# Patient Record
Sex: MALE | Race: WHITE | NOT HISPANIC OR LATINO
[De-identification: names, ages, dates, MRNs, and addresses within clinical notes are randomized per-mention and may not be internally consistent; named-entity substitution may affect disease eponyms.]

---

## 2017-01-04 ENCOUNTER — APPOINTMENT (OUTPATIENT)
Dept: CARDIOTHORACIC SURGERY | Facility: CLINIC | Age: 47
End: 2017-01-04

## 2017-01-04 VITALS
RESPIRATION RATE: 15 BRPM | DIASTOLIC BLOOD PRESSURE: 82 MMHG | HEART RATE: 77 BPM | HEIGHT: 67 IN | OXYGEN SATURATION: 100 % | TEMPERATURE: 97.3 F | SYSTOLIC BLOOD PRESSURE: 138 MMHG

## 2017-01-04 DIAGNOSIS — I71.2 THORACIC AORTIC ANEURYSM, W/OUT RUPTURE: ICD-10-CM

## 2017-01-04 DIAGNOSIS — R06.02 SHORTNESS OF BREATH: ICD-10-CM

## 2017-01-10 ENCOUNTER — APPOINTMENT (OUTPATIENT)
Dept: CARDIOLOGY | Facility: CLINIC | Age: 47
End: 2017-01-10

## 2017-01-10 ENCOUNTER — NON-APPOINTMENT (OUTPATIENT)
Age: 47
End: 2017-01-10

## 2017-01-10 VITALS
OXYGEN SATURATION: 97 % | RESPIRATION RATE: 12 BRPM | HEIGHT: 67 IN | HEART RATE: 69 BPM | BODY MASS INDEX: 25.74 KG/M2 | SYSTOLIC BLOOD PRESSURE: 117 MMHG | WEIGHT: 164 LBS | DIASTOLIC BLOOD PRESSURE: 78 MMHG

## 2017-01-12 ENCOUNTER — APPOINTMENT (OUTPATIENT)
Dept: CARDIOLOGY | Facility: CLINIC | Age: 47
End: 2017-01-12

## 2017-01-16 LAB
25(OH)D3 SERPL-MCNC: 22.9 NG/ML
ALBUMIN SERPL ELPH-MCNC: 4.8 G/DL
ALP BLD-CCNC: 76 U/L
ALT SERPL-CCNC: 27 U/L
ANION GAP SERPL CALC-SCNC: 14 MMOL/L
APPEARANCE: CLEAR
AST SERPL-CCNC: 30 U/L
BASOPHILS # BLD AUTO: 0.04 K/UL
BASOPHILS NFR BLD AUTO: 0.6 %
BILIRUB SERPL-MCNC: 1 MG/DL
BILIRUBIN URINE: NEGATIVE
BLOOD URINE: NEGATIVE
BUN SERPL-MCNC: 15 MG/DL
CALCIUM SERPL-MCNC: 10.1 MG/DL
CHLORIDE SERPL-SCNC: 99 MMOL/L
CHOLEST SERPL-MCNC: 125 MG/DL
CHOLEST/HDLC SERPL: 2.3 RATIO
CO2 SERPL-SCNC: 26 MMOL/L
COLOR: YELLOW
CREAT SERPL-MCNC: 0.9 MG/DL
CREAT SPEC-SCNC: 55 MG/DL
EOSINOPHIL # BLD AUTO: 0.11 K/UL
EOSINOPHIL NFR BLD AUTO: 1.7 %
GLUCOSE QUALITATIVE U: NORMAL MG/DL
GLUCOSE SERPL-MCNC: 73 MG/DL
HCT VFR BLD CALC: 45.8 %
HDLC SERPL-MCNC: 55 MG/DL
HGB BLD-MCNC: 15.1 G/DL
IMM GRANULOCYTES NFR BLD AUTO: 0.2 %
KETONES URINE: NEGATIVE
LDLC SERPL CALC-MCNC: 55 MG/DL
LEUKOCYTE ESTERASE URINE: NEGATIVE
LYMPHOCYTES # BLD AUTO: 1.35 K/UL
LYMPHOCYTES NFR BLD AUTO: 21.4 %
MAN DIFF?: NORMAL
MCHC RBC-ENTMCNC: 31.7 PG
MCHC RBC-ENTMCNC: 33 GM/DL
MCV RBC AUTO: 96 FL
MICROALBUMIN 24H UR DL<=1MG/L-MCNC: 0.5 MG/DL
MICROALBUMIN/CREAT 24H UR-RTO: 9 UG/MG
MONOCYTES # BLD AUTO: 0.59 K/UL
MONOCYTES NFR BLD AUTO: 9.3 %
NEUTROPHILS # BLD AUTO: 4.22 K/UL
NEUTROPHILS NFR BLD AUTO: 66.8 %
NITRITE URINE: NEGATIVE
PH URINE: 7.5
PLATELET # BLD AUTO: 254 K/UL
POTASSIUM SERPL-SCNC: 4.5 MMOL/L
PROT SERPL-MCNC: 7.9 G/DL
PROTEIN URINE: NEGATIVE MG/DL
RBC # BLD: 4.77 M/UL
RBC # FLD: 12.9 %
SODIUM SERPL-SCNC: 139 MMOL/L
SPECIFIC GRAVITY URINE: 1.01
TRIGL SERPL-MCNC: 76 MG/DL
TSH SERPL-ACNC: 1.18 UIU/ML
UROBILINOGEN URINE: NORMAL MG/DL
WBC # FLD AUTO: 6.32 K/UL

## 2017-02-07 ENCOUNTER — MESSAGE (OUTPATIENT)
Age: 47
End: 2017-02-07

## 2017-02-17 ENCOUNTER — OUTPATIENT (OUTPATIENT)
Dept: OUTPATIENT SERVICES | Facility: HOSPITAL | Age: 47
LOS: 1 days | End: 2017-02-17
Payer: COMMERCIAL

## 2017-02-17 DIAGNOSIS — N44.00 TORSION OF TESTIS, UNSPECIFIED: Chronic | ICD-10-CM

## 2017-02-17 DIAGNOSIS — Z98.890 OTHER SPECIFIED POSTPROCEDURAL STATES: Chronic | ICD-10-CM

## 2017-02-17 PROCEDURE — 71552 MRI CHEST W/O & W/DYE: CPT | Mod: 26

## 2017-02-27 ENCOUNTER — OUTPATIENT (OUTPATIENT)
Dept: OUTPATIENT SERVICES | Facility: HOSPITAL | Age: 47
LOS: 1 days | End: 2017-02-27
Payer: MEDICAID

## 2017-02-27 ENCOUNTER — APPOINTMENT (OUTPATIENT)
Dept: THORACIC SURGERY | Facility: CLINIC | Age: 47
End: 2017-02-27

## 2017-02-27 VITALS
OXYGEN SATURATION: 97 % | DIASTOLIC BLOOD PRESSURE: 71 MMHG | WEIGHT: 164 LBS | BODY MASS INDEX: 25.69 KG/M2 | TEMPERATURE: 98 F | SYSTOLIC BLOOD PRESSURE: 119 MMHG | RESPIRATION RATE: 18 BRPM | HEART RATE: 66 BPM

## 2017-02-27 DIAGNOSIS — N44.00 TORSION OF TESTIS, UNSPECIFIED: Chronic | ICD-10-CM

## 2017-02-27 DIAGNOSIS — Z98.890 OTHER SPECIFIED POSTPROCEDURAL STATES: Chronic | ICD-10-CM

## 2017-02-27 DIAGNOSIS — D49.89 NEOPLASM OF UNSPECIFIED BEHAVIOR OF OTHER SPECIFIED SITES: ICD-10-CM

## 2017-02-27 LAB — HCG SERPL-ACNC: <1 MIU/ML — SIGNIFICANT CHANGE UP

## 2017-02-27 PROCEDURE — 86041 ACETYLCHOLN RCPTR BNDNG ANTB: CPT

## 2017-02-27 PROCEDURE — 84702 CHORIONIC GONADOTROPIN TEST: CPT

## 2017-02-27 PROCEDURE — 82105 ALPHA-FETOPROTEIN SERUM: CPT

## 2017-02-28 LAB — AFP-TM SERPL-MCNC: 4.8 NG/ML — SIGNIFICANT CHANGE UP

## 2017-03-01 ENCOUNTER — APPOINTMENT (OUTPATIENT)
Dept: CARDIOTHORACIC SURGERY | Facility: CLINIC | Age: 47
End: 2017-03-01

## 2017-03-01 ENCOUNTER — APPOINTMENT (OUTPATIENT)
Dept: THORACIC SURGERY | Facility: CLINIC | Age: 47
End: 2017-03-01

## 2017-03-08 LAB
ACRM MODULATING ANTIBODY: SIGNIFICANT CHANGE UP
ACRM MODULATING ANTIBODY: SIGNIFICANT CHANGE UP
P/Q TYPE ANTIBODY: SIGNIFICANT CHANGE UP
STRIA MUS AB TITR SER: SIGNIFICANT CHANGE UP
VGCC-N BIND AB SER-SCNC: SIGNIFICANT CHANGE UP

## 2017-03-27 ENCOUNTER — APPOINTMENT (OUTPATIENT)
Dept: HEMATOLOGY ONCOLOGY | Facility: CLINIC | Age: 47
End: 2017-03-27

## 2017-04-16 ENCOUNTER — MEDICATION RENEWAL (OUTPATIENT)
Age: 47
End: 2017-04-16

## 2017-05-12 ENCOUNTER — TRANSCRIPTION ENCOUNTER (OUTPATIENT)
Age: 47
End: 2017-05-12

## 2017-06-07 ENCOUNTER — APPOINTMENT (OUTPATIENT)
Dept: UROLOGY | Facility: CLINIC | Age: 47
End: 2017-06-07

## 2017-06-07 PROBLEM — R22.2 CHEST MASS: Status: ACTIVE | Noted: 2017-06-07

## 2017-06-07 LAB
APPEARANCE: CLEAR
BACTERIA: NEGATIVE
BILIRUBIN URINE: NEGATIVE
BLOOD URINE: NEGATIVE
COLOR: YELLOW
GLUCOSE QUALITATIVE U: NORMAL MG/DL
KETONES URINE: NEGATIVE
LEUKOCYTE ESTERASE URINE: NEGATIVE
MICROSCOPIC-UA: NORMAL
NITRITE URINE: NEGATIVE
PH URINE: 7
PROTEIN URINE: NEGATIVE MG/DL
RED BLOOD CELLS URINE: 2 /HPF
SPECIFIC GRAVITY URINE: 1.01
SQUAMOUS EPITHELIAL CELLS: 0 /HPF
UROBILINOGEN URINE: NORMAL MG/DL
WHITE BLOOD CELLS URINE: 0 /HPF

## 2017-06-08 LAB — CORE LAB FLUID CYTOLOGY: NORMAL

## 2017-06-11 LAB
ALBUMIN SERPL ELPH-MCNC: 5 G/DL
ALP BLD-CCNC: 72 U/L
ALT SERPL-CCNC: 46 U/L
ANION GAP SERPL CALC-SCNC: 21 MMOL/L
AST SERPL-CCNC: 30 U/L
BILIRUB SERPL-MCNC: 1 MG/DL
BUN SERPL-MCNC: 18 MG/DL
CALCIUM SERPL-MCNC: 10.2 MG/DL
CHLORIDE SERPL-SCNC: 104 MMOL/L
CO2 SERPL-SCNC: 19 MMOL/L
CREAT SERPL-MCNC: 1.18 MG/DL
GLUCOSE SERPL-MCNC: 92 MG/DL
POTASSIUM SERPL-SCNC: 5.2 MMOL/L
PROT SERPL-MCNC: 7.8 G/DL
PSA SERPL-MCNC: 0.5 NG/ML
SODIUM SERPL-SCNC: 144 MMOL/L
TESTOST SERPL-MCNC: 510.5 NG/DL

## 2017-06-15 ENCOUNTER — FORM ENCOUNTER (OUTPATIENT)
Age: 47
End: 2017-06-15

## 2017-06-16 ENCOUNTER — OUTPATIENT (OUTPATIENT)
Dept: OUTPATIENT SERVICES | Facility: HOSPITAL | Age: 47
LOS: 1 days | End: 2017-06-16
Payer: COMMERCIAL

## 2017-06-16 DIAGNOSIS — Z98.890 OTHER SPECIFIED POSTPROCEDURAL STATES: Chronic | ICD-10-CM

## 2017-06-16 DIAGNOSIS — N44.00 TORSION OF TESTIS, UNSPECIFIED: Chronic | ICD-10-CM

## 2017-06-16 PROCEDURE — A9585: CPT

## 2017-06-16 PROCEDURE — 71552 MRI CHEST W/O & W/DYE: CPT | Mod: 26

## 2017-06-16 PROCEDURE — 71552 MRI CHEST W/O & W/DYE: CPT

## 2017-06-19 ENCOUNTER — APPOINTMENT (OUTPATIENT)
Dept: CARDIOLOGY | Facility: CLINIC | Age: 47
End: 2017-06-19

## 2017-06-19 ENCOUNTER — NON-APPOINTMENT (OUTPATIENT)
Age: 47
End: 2017-06-19

## 2017-06-19 VITALS — SYSTOLIC BLOOD PRESSURE: 120 MMHG | HEART RATE: 66 BPM | DIASTOLIC BLOOD PRESSURE: 80 MMHG

## 2017-06-19 VITALS
BODY MASS INDEX: 25.74 KG/M2 | SYSTOLIC BLOOD PRESSURE: 116 MMHG | OXYGEN SATURATION: 98 % | RESPIRATION RATE: 12 BRPM | HEIGHT: 67 IN | HEART RATE: 66 BPM | WEIGHT: 164 LBS | DIASTOLIC BLOOD PRESSURE: 77 MMHG

## 2017-06-22 LAB
25(OH)D3 SERPL-MCNC: 37 NG/ML
ALBUMIN SERPL ELPH-MCNC: 4.7 G/DL
ALP BLD-CCNC: 62 U/L
ALT SERPL-CCNC: 42 U/L
ANION GAP SERPL CALC-SCNC: 17 MMOL/L
AST SERPL-CCNC: 33 U/L
BASOPHILS # BLD AUTO: 0.03 K/UL
BASOPHILS NFR BLD AUTO: 0.7 %
BILIRUB SERPL-MCNC: 1 MG/DL
BUN SERPL-MCNC: 17 MG/DL
CALCIUM SERPL-MCNC: 9.9 MG/DL
CHLORIDE SERPL-SCNC: 102 MMOL/L
CHOLEST SERPL-MCNC: 129 MG/DL
CHOLEST/HDLC SERPL: 2.5 RATIO
CO2 SERPL-SCNC: 23 MMOL/L
CREAT SERPL-MCNC: 1.04 MG/DL
EOSINOPHIL # BLD AUTO: 0.13 K/UL
EOSINOPHIL NFR BLD AUTO: 2.9 %
GLUCOSE SERPL-MCNC: 63 MG/DL
HCT VFR BLD CALC: 44.8 %
HDLC SERPL-MCNC: 52 MG/DL
HGB BLD-MCNC: 14.7 G/DL
IMM GRANULOCYTES NFR BLD AUTO: 0 %
LDLC SERPL CALC-MCNC: 62 MG/DL
LYMPHOCYTES # BLD AUTO: 1.19 K/UL
LYMPHOCYTES NFR BLD AUTO: 26.9 %
MAN DIFF?: NORMAL
MCHC RBC-ENTMCNC: 30.6 PG
MCHC RBC-ENTMCNC: 32.8 GM/DL
MCV RBC AUTO: 93.3 FL
MONOCYTES # BLD AUTO: 0.42 K/UL
MONOCYTES NFR BLD AUTO: 9.5 %
NEUTROPHILS # BLD AUTO: 2.66 K/UL
NEUTROPHILS NFR BLD AUTO: 60 %
PLATELET # BLD AUTO: 183 K/UL
POTASSIUM SERPL-SCNC: 5.1 MMOL/L
PROT SERPL-MCNC: 7.8 G/DL
RBC # BLD: 4.8 M/UL
RBC # FLD: 13.1 %
SODIUM SERPL-SCNC: 142 MMOL/L
TRIGL SERPL-MCNC: 77 MG/DL
TSH SERPL-ACNC: 1.35 UIU/ML
WBC # FLD AUTO: 4.43 K/UL

## 2017-08-01 ENCOUNTER — MEDICATION RENEWAL (OUTPATIENT)
Age: 47
End: 2017-08-01

## 2017-10-17 ENCOUNTER — NON-APPOINTMENT (OUTPATIENT)
Age: 47
End: 2017-10-17

## 2017-10-17 ENCOUNTER — APPOINTMENT (OUTPATIENT)
Dept: CARDIOLOGY | Facility: CLINIC | Age: 47
End: 2017-10-17
Payer: COMMERCIAL

## 2017-10-17 VITALS — SYSTOLIC BLOOD PRESSURE: 118 MMHG | HEART RATE: 63 BPM | DIASTOLIC BLOOD PRESSURE: 78 MMHG

## 2017-10-17 VITALS
RESPIRATION RATE: 12 BRPM | WEIGHT: 164 LBS | SYSTOLIC BLOOD PRESSURE: 136 MMHG | BODY MASS INDEX: 25.74 KG/M2 | DIASTOLIC BLOOD PRESSURE: 87 MMHG | OXYGEN SATURATION: 97 % | HEART RATE: 61 BPM | HEIGHT: 67 IN

## 2017-10-17 PROCEDURE — 93306 TTE W/DOPPLER COMPLETE: CPT

## 2017-10-17 PROCEDURE — 93000 ELECTROCARDIOGRAM COMPLETE: CPT

## 2017-10-17 PROCEDURE — 99214 OFFICE O/P EST MOD 30 MIN: CPT | Mod: 25

## 2017-10-18 ENCOUNTER — MED ADMIN CHARGE (OUTPATIENT)
Age: 47
End: 2017-10-18

## 2017-10-25 LAB
25(OH)D3 SERPL-MCNC: 30 NG/ML
ALBUMIN SERPL ELPH-MCNC: 5 G/DL
ALP BLD-CCNC: 67 U/L
ALT SERPL-CCNC: 35 U/L
ANION GAP SERPL CALC-SCNC: 13 MMOL/L
AST SERPL-CCNC: 22 U/L
BASOPHILS # BLD AUTO: 0.03 K/UL
BASOPHILS NFR BLD AUTO: 0.6 %
BILIRUB SERPL-MCNC: 1.2 MG/DL
BUN SERPL-MCNC: 17 MG/DL
CALCIUM SERPL-MCNC: 10.1 MG/DL
CHLORIDE SERPL-SCNC: 102 MMOL/L
CHOLEST SERPL-MCNC: 147 MG/DL
CHOLEST/HDLC SERPL: 2.5 RATIO
CO2 SERPL-SCNC: 28 MMOL/L
CREAT SERPL-MCNC: 1.07 MG/DL
EOSINOPHIL # BLD AUTO: 0.11 K/UL
EOSINOPHIL NFR BLD AUTO: 2.1 %
GLUCOSE SERPL-MCNC: 89 MG/DL
HBA1C MFR BLD HPLC: 4.9 %
HCT VFR BLD CALC: 47.5 %
HDLC SERPL-MCNC: 58 MG/DL
HGB BLD-MCNC: 15.7 G/DL
IMM GRANULOCYTES NFR BLD AUTO: 0 %
LDLC SERPL CALC-MCNC: 69 MG/DL
LYMPHOCYTES # BLD AUTO: 1.32 K/UL
LYMPHOCYTES NFR BLD AUTO: 24.7 %
MAN DIFF?: NORMAL
MCHC RBC-ENTMCNC: 31.6 PG
MCHC RBC-ENTMCNC: 33.1 GM/DL
MCV RBC AUTO: 95.6 FL
MONOCYTES # BLD AUTO: 0.43 K/UL
MONOCYTES NFR BLD AUTO: 8.1 %
NEUTROPHILS # BLD AUTO: 3.45 K/UL
NEUTROPHILS NFR BLD AUTO: 64.5 %
PLATELET # BLD AUTO: 184 K/UL
POTASSIUM SERPL-SCNC: 5 MMOL/L
PROT SERPL-MCNC: 7.8 G/DL
RBC # BLD: 4.97 M/UL
RBC # FLD: 12.7 %
SODIUM SERPL-SCNC: 143 MMOL/L
TRIGL SERPL-MCNC: 98 MG/DL
WBC # FLD AUTO: 5.34 K/UL

## 2017-10-27 ENCOUNTER — MEDICATION RENEWAL (OUTPATIENT)
Age: 47
End: 2017-10-27

## 2017-12-06 ENCOUNTER — APPOINTMENT (OUTPATIENT)
Dept: UROLOGY | Facility: CLINIC | Age: 47
End: 2017-12-06
Payer: COMMERCIAL

## 2017-12-06 DIAGNOSIS — Z12.5 ENCOUNTER FOR SCREENING FOR MALIGNANT NEOPLASM OF PROSTATE: ICD-10-CM

## 2017-12-06 DIAGNOSIS — R39.9 UNSPECIFIED SYMPTOMS AND SIGNS INVOLVING THE GENITOURINARY SYSTEM: ICD-10-CM

## 2017-12-06 DIAGNOSIS — R68.82 DECREASED LIBIDO: ICD-10-CM

## 2017-12-06 PROCEDURE — 99214 OFFICE O/P EST MOD 30 MIN: CPT

## 2017-12-07 LAB
APPEARANCE: CLEAR
BACTERIA: NEGATIVE
BILIRUBIN URINE: NEGATIVE
BLOOD URINE: NEGATIVE
COLOR: YELLOW
GLUCOSE QUALITATIVE U: NEGATIVE MG/DL
KETONES URINE: NEGATIVE
LEUKOCYTE ESTERASE URINE: NEGATIVE
MICROSCOPIC-UA: NORMAL
NITRITE URINE: NEGATIVE
PH URINE: 7.5
PROTEIN URINE: NEGATIVE MG/DL
PSA SERPL-MCNC: 0.44 NG/ML
RED BLOOD CELLS URINE: 0 /HPF
SPECIFIC GRAVITY URINE: 1.01
SQUAMOUS EPITHELIAL CELLS: 0 /HPF
UROBILINOGEN URINE: NEGATIVE MG/DL
WHITE BLOOD CELLS URINE: 0 /HPF

## 2017-12-08 LAB
BACTERIA UR CULT: NORMAL
CORE LAB FLUID CYTOLOGY: NORMAL

## 2018-02-09 ENCOUNTER — OTHER (OUTPATIENT)
Age: 48
End: 2018-02-09

## 2018-05-07 ENCOUNTER — MEDICATION RENEWAL (OUTPATIENT)
Age: 48
End: 2018-05-07

## 2018-05-12 ENCOUNTER — NON-APPOINTMENT (OUTPATIENT)
Age: 48
End: 2018-05-12

## 2018-05-12 ENCOUNTER — APPOINTMENT (OUTPATIENT)
Dept: CARDIOLOGY | Facility: CLINIC | Age: 48
End: 2018-05-12
Payer: COMMERCIAL

## 2018-05-12 VITALS
HEART RATE: 61 BPM | HEIGHT: 67 IN | RESPIRATION RATE: 12 BRPM | DIASTOLIC BLOOD PRESSURE: 80 MMHG | TEMPERATURE: 97.6 F | OXYGEN SATURATION: 99 % | WEIGHT: 166 LBS | SYSTOLIC BLOOD PRESSURE: 137 MMHG | BODY MASS INDEX: 26.06 KG/M2

## 2018-05-12 VITALS — HEART RATE: 72 BPM | DIASTOLIC BLOOD PRESSURE: 80 MMHG | SYSTOLIC BLOOD PRESSURE: 118 MMHG

## 2018-05-12 PROCEDURE — 93000 ELECTROCARDIOGRAM COMPLETE: CPT

## 2018-05-12 PROCEDURE — 99214 OFFICE O/P EST MOD 30 MIN: CPT | Mod: 25

## 2018-06-19 ENCOUNTER — MEDICATION RENEWAL (OUTPATIENT)
Age: 48
End: 2018-06-19

## 2018-07-07 LAB
25(OH)D3 SERPL-MCNC: 31.6 NG/ML
ALBUMIN SERPL ELPH-MCNC: 4.7 G/DL
ALP BLD-CCNC: 60 U/L
ALT SERPL-CCNC: 26 U/L
ANION GAP SERPL CALC-SCNC: 16 MMOL/L
AST SERPL-CCNC: 23 U/L
BASOPHILS # BLD AUTO: 0.04 K/UL
BASOPHILS NFR BLD AUTO: 0.8 %
BILIRUB SERPL-MCNC: 1.3 MG/DL
BUN SERPL-MCNC: 16 MG/DL
CALCIUM SERPL-MCNC: 10 MG/DL
CHLORIDE SERPL-SCNC: 102 MMOL/L
CHOLEST SERPL-MCNC: 136 MG/DL
CHOLEST/HDLC SERPL: 2.5 RATIO
CO2 SERPL-SCNC: 23 MMOL/L
CREAT SERPL-MCNC: 1.08 MG/DL
EOSINOPHIL # BLD AUTO: 0.13 K/UL
EOSINOPHIL NFR BLD AUTO: 2.7 %
GLUCOSE SERPL-MCNC: 85 MG/DL
HCT VFR BLD CALC: 47.4 %
HDLC SERPL-MCNC: 54 MG/DL
HGB BLD-MCNC: 16 G/DL
IMM GRANULOCYTES NFR BLD AUTO: 0.2 %
LDLC SERPL CALC-MCNC: 66 MG/DL
LYMPHOCYTES # BLD AUTO: 1.32 K/UL
LYMPHOCYTES NFR BLD AUTO: 27.2 %
MAN DIFF?: NORMAL
MCHC RBC-ENTMCNC: 32 PG
MCHC RBC-ENTMCNC: 33.8 GM/DL
MCV RBC AUTO: 94.8 FL
MONOCYTES # BLD AUTO: 0.52 K/UL
MONOCYTES NFR BLD AUTO: 10.7 %
NEUTROPHILS # BLD AUTO: 2.83 K/UL
NEUTROPHILS NFR BLD AUTO: 58.4 %
PLATELET # BLD AUTO: 210 K/UL
POTASSIUM SERPL-SCNC: 4 MMOL/L
PROT SERPL-MCNC: 8.1 G/DL
RBC # BLD: 5 M/UL
RBC # FLD: 12.8 %
SODIUM SERPL-SCNC: 141 MMOL/L
TRIGL SERPL-MCNC: 80 MG/DL
TSH SERPL-ACNC: 1.13 UIU/ML
WBC # FLD AUTO: 4.85 K/UL

## 2018-08-09 ENCOUNTER — TRANSCRIPTION ENCOUNTER (OUTPATIENT)
Age: 48
End: 2018-08-09

## 2018-12-05 ENCOUNTER — APPOINTMENT (OUTPATIENT)
Dept: MRI IMAGING | Facility: CLINIC | Age: 48
End: 2018-12-05
Payer: COMMERCIAL

## 2018-12-05 ENCOUNTER — OUTPATIENT (OUTPATIENT)
Dept: OUTPATIENT SERVICES | Facility: HOSPITAL | Age: 48
LOS: 1 days | End: 2018-12-05
Payer: COMMERCIAL

## 2018-12-05 ENCOUNTER — APPOINTMENT (OUTPATIENT)
Dept: UROLOGY | Facility: CLINIC | Age: 48
End: 2018-12-05

## 2018-12-05 DIAGNOSIS — Z00.8 ENCOUNTER FOR OTHER GENERAL EXAMINATION: ICD-10-CM

## 2018-12-05 DIAGNOSIS — N44.00 TORSION OF TESTIS, UNSPECIFIED: Chronic | ICD-10-CM

## 2018-12-05 DIAGNOSIS — Z98.890 OTHER SPECIFIED POSTPROCEDURAL STATES: Chronic | ICD-10-CM

## 2018-12-05 PROCEDURE — 71555 MRI ANGIO CHEST W OR W/O DYE: CPT | Mod: 26

## 2018-12-05 PROCEDURE — 82565 ASSAY OF CREATININE: CPT

## 2018-12-05 PROCEDURE — C8911: CPT

## 2018-12-05 PROCEDURE — A9585: CPT

## 2018-12-14 ENCOUNTER — APPOINTMENT (OUTPATIENT)
Dept: CARDIOTHORACIC SURGERY | Facility: CLINIC | Age: 48
End: 2018-12-14
Payer: COMMERCIAL

## 2018-12-14 VITALS
BODY MASS INDEX: 26.06 KG/M2 | WEIGHT: 166 LBS | RESPIRATION RATE: 16 BRPM | SYSTOLIC BLOOD PRESSURE: 110 MMHG | DIASTOLIC BLOOD PRESSURE: 75 MMHG | HEART RATE: 78 BPM | OXYGEN SATURATION: 98 % | HEIGHT: 67 IN

## 2018-12-14 PROCEDURE — 99213 OFFICE O/P EST LOW 20 MIN: CPT

## 2018-12-21 ENCOUNTER — NON-APPOINTMENT (OUTPATIENT)
Age: 48
End: 2018-12-21

## 2018-12-21 ENCOUNTER — RESULT CHARGE (OUTPATIENT)
Age: 48
End: 2018-12-21

## 2018-12-21 ENCOUNTER — APPOINTMENT (OUTPATIENT)
Dept: CARDIOLOGY | Facility: CLINIC | Age: 48
End: 2018-12-21
Payer: COMMERCIAL

## 2018-12-21 VITALS
TEMPERATURE: 98.6 F | DIASTOLIC BLOOD PRESSURE: 91 MMHG | HEART RATE: 89 BPM | SYSTOLIC BLOOD PRESSURE: 146 MMHG | HEIGHT: 67 IN | BODY MASS INDEX: 28.88 KG/M2 | OXYGEN SATURATION: 100 % | WEIGHT: 184 LBS | RESPIRATION RATE: 16 BRPM

## 2018-12-21 VITALS — DIASTOLIC BLOOD PRESSURE: 90 MMHG | SYSTOLIC BLOOD PRESSURE: 146 MMHG

## 2018-12-21 PROCEDURE — 93224 XTRNL ECG REC UP TO 48 HRS: CPT

## 2018-12-21 PROCEDURE — 99214 OFFICE O/P EST MOD 30 MIN: CPT | Mod: 25

## 2018-12-21 PROCEDURE — 93000 ELECTROCARDIOGRAM COMPLETE: CPT | Mod: 59

## 2018-12-22 LAB
25(OH)D3 SERPL-MCNC: 23.3 NG/ML
ALBUMIN SERPL ELPH-MCNC: 4.8 G/DL
ALP BLD-CCNC: 63 U/L
ALT SERPL-CCNC: 27 U/L
ANION GAP SERPL CALC-SCNC: 14 MMOL/L
APPEARANCE: CLEAR
AST SERPL-CCNC: 35 U/L
BASOPHILS # BLD AUTO: 0.04 K/UL
BASOPHILS NFR BLD AUTO: 0.7 %
BILIRUB SERPL-MCNC: 1.3 MG/DL
BILIRUBIN URINE: NEGATIVE
BLOOD URINE: NEGATIVE
BUN SERPL-MCNC: 19 MG/DL
CALCIUM SERPL-MCNC: 9.6 MG/DL
CHLORIDE SERPL-SCNC: 100 MMOL/L
CHOLEST SERPL-MCNC: 168 MG/DL
CHOLEST/HDLC SERPL: 2.4 RATIO
CO2 SERPL-SCNC: 24 MMOL/L
COLOR: YELLOW
CREAT SERPL-MCNC: 0.91 MG/DL
CREAT SPEC-SCNC: 20 MG/DL
EOSINOPHIL # BLD AUTO: 0.11 K/UL
EOSINOPHIL NFR BLD AUTO: 1.9 %
GLUCOSE QUALITATIVE U: NEGATIVE MG/DL
GLUCOSE SERPL-MCNC: 80 MG/DL
HBA1C MFR BLD HPLC: 4.9 %
HCT VFR BLD CALC: 48.2 %
HDLC SERPL-MCNC: 71 MG/DL
HGB BLD-MCNC: 16 G/DL
IMM GRANULOCYTES NFR BLD AUTO: 0.3 %
KETONES URINE: NEGATIVE
LDLC SERPL CALC-MCNC: 80 MG/DL
LEUKOCYTE ESTERASE URINE: NEGATIVE
LYMPHOCYTES # BLD AUTO: 1.21 K/UL
LYMPHOCYTES NFR BLD AUTO: 20.7 %
MAN DIFF?: NORMAL
MCHC RBC-ENTMCNC: 32.2 PG
MCHC RBC-ENTMCNC: 33.2 GM/DL
MCV RBC AUTO: 97 FL
MICROALBUMIN 24H UR DL<=1MG/L-MCNC: <1.2 MG/DL
MICROALBUMIN/CREAT 24H UR-RTO: NORMAL
MONOCYTES # BLD AUTO: 0.36 K/UL
MONOCYTES NFR BLD AUTO: 6.2 %
NEUTROPHILS # BLD AUTO: 4.1 K/UL
NEUTROPHILS NFR BLD AUTO: 70.2 %
NITRITE URINE: NEGATIVE
PH URINE: 6.5
PLATELET # BLD AUTO: 203 K/UL
POTASSIUM SERPL-SCNC: 4.4 MMOL/L
PROT SERPL-MCNC: 7.9 G/DL
PROTEIN URINE: NEGATIVE MG/DL
PSA SERPL-MCNC: 0.48 NG/ML
RBC # BLD: 4.97 M/UL
RBC # FLD: 12.4 %
SODIUM SERPL-SCNC: 138 MMOL/L
SPECIFIC GRAVITY URINE: 1.01
TRIGL SERPL-MCNC: 86 MG/DL
TSH SERPL-ACNC: 1.26 UIU/ML
UROBILINOGEN URINE: NEGATIVE MG/DL
WBC # FLD AUTO: 5.84 K/UL

## 2018-12-26 ENCOUNTER — NON-APPOINTMENT (OUTPATIENT)
Age: 48
End: 2018-12-26

## 2018-12-26 ENCOUNTER — MEDICATION RENEWAL (OUTPATIENT)
Age: 48
End: 2018-12-26

## 2019-01-01 NOTE — PHYSICAL EXAM
[General Appearance - Well Developed] : well developed [Normal Appearance] : normal appearance [Well Groomed] : well groomed [General Appearance - Well Nourished] : well nourished [No Deformities] : no deformities [General Appearance - In No Acute Distress] : no acute distress [Normal Conjunctiva] : the conjunctiva exhibited no abnormalities [Normal Oral Mucosa] : normal oral mucosa [No Oral Pallor] : no oral pallor [No Oral Cyanosis] : no oral cyanosis [Normal Jugular Venous A Waves Present] : normal jugular venous A waves present [Normal Jugular Venous V Waves Present] : normal jugular venous V waves present [No Jugular Venous Wall A Waves] : no jugular venous wall A waves [Respiration, Rhythm And Depth] : normal respiratory rhythm and effort [Exaggerated Use Of Accessory Muscles For Inspiration] : no accessory muscle use [Auscultation Breath Sounds / Voice Sounds] : lungs were clear to auscultation bilaterally [Bowel Sounds] : normal bowel sounds [Abdomen Soft] : soft [Abdomen Tenderness] : non-tender [Abnormal Walk] : normal gait [Gait - Sufficient For Exercise Testing] : the gait was sufficient for exercise testing [Nail Clubbing] : no clubbing of the fingernails [Cyanosis, Localized] : no localized cyanosis [Petechial Hemorrhages (___cm)] : no petechial hemorrhages [Skin Color & Pigmentation] : normal skin color and pigmentation [] : no rash [No Skin Ulcers] : no skin ulcer [Oriented To Time, Place, And Person] : oriented to person, place, and time [Affect] : the affect was normal [Mood] : the mood was normal [No Anxiety] : not feeling anxious [Normal Rate] : normal [Rhythm Regular] : regular [Normal S1] : normal S1 [Normal S2] : normal S2 [II] : a grade 2 [I] : a grade 1 [No Pitting Edema] : no pitting edema present [FreeTextEntry1] : Extraocular muscles intact.  Anicteric sclerae. [S3] : no S3 [Right Carotid Bruit] : no bruit heard over the right carotid [Left Carotid Bruit] : no bruit heard over the left carotid [Bruit] : no bruit heard

## 2019-01-01 NOTE — HISTORY OF PRESENT ILLNESS
[FreeTextEntry1] : Patient is a 48 year old man with a history of coronary artery disease status post drug-eluting stents to the mid RCA and mid LAD 1/14/2016, hypertension, hyperlipidemia, family history of coronary artery disease, bicuspid aortic valve, ascending aortic aneurysm, and mediastinal hematoma (since resolved) who presents today for follow up of coronary artery disease and HTN. He states that he has not been watching his diet since his last visit with me and unfortunately, he has started drinking again. He otherwise denies any chest pain, dyspnea, dizziness, or headaches.  He does mention experiencing palpitations, however, he also states that he has not been drinking enough water.

## 2019-01-01 NOTE — DISCUSSION/SUMMARY
[FreeTextEntry1] : IMPRESSION: Mr. Aiken is a 48 year old man with a history of coronary artery disease status post RESOLUTE JOHN to the mid RCA and mid LAD 1/2016, hypertension, hyperlipidemia, family history of coronary artery disease, bicuspid aortic valve, ascending aortic aneurysm, mediastinal hematoma (since resolved) who presents today for follow up of coronary artery disease and HTN. \par \par PLAN:\par 1. His blood pressure is elevated, which he feels is secondary to his dietary indiscretion and his alcohol intake. We discussed adjusting his antihypertensive regimen, however, he wants to modify his diet for now. He states that he will follow his blood pressure at home as well and will notify me should it be elevated. For now, he will continue on Amlodipine 5 mg twice and Toprol XL 25 mg daily. \par 2. I will be placing a 24 hour Holter monitor given his palpitations. I have asked him to reduced his alcohol intake and to increase his water intake. \par 3. He denies any chest pain or dyspnea. He had a cardiac catheterization performed 11/2016 that revealed nonobstructive CAD. He will continue on ASA 81mg daily and Brilinta 60mg twice daily.  He will come back fasting for blood work which will include a CBC to evaluate his platelets on dual antiplatelet therapy.\par 4. He will continue on Lipitor 20mg daily and he will come back for a CMP and lipid profile. \par 5. I have asked him to to schedule an echocardiogram to follow up his bicuspid aortic valve and his aortic aneurysm.\par 6. He will follow up with me in 3 months or sooner should he experience any new or worsening symptoms in the interim.\par 7. He had an MRA of the chest recently that revealed a stable aortic aneurysm.

## 2019-03-07 ENCOUNTER — TRANSCRIPTION ENCOUNTER (OUTPATIENT)
Age: 49
End: 2019-03-07

## 2019-04-30 ENCOUNTER — NON-APPOINTMENT (OUTPATIENT)
Age: 49
End: 2019-04-30

## 2019-04-30 ENCOUNTER — APPOINTMENT (OUTPATIENT)
Dept: CARDIOLOGY | Facility: CLINIC | Age: 49
End: 2019-04-30
Payer: COMMERCIAL

## 2019-04-30 VITALS
RESPIRATION RATE: 12 BRPM | WEIGHT: 177 LBS | HEART RATE: 61 BPM | BODY MASS INDEX: 27.78 KG/M2 | OXYGEN SATURATION: 98 % | DIASTOLIC BLOOD PRESSURE: 91 MMHG | SYSTOLIC BLOOD PRESSURE: 148 MMHG | HEIGHT: 67 IN

## 2019-04-30 VITALS — HEART RATE: 60 BPM | SYSTOLIC BLOOD PRESSURE: 126 MMHG | DIASTOLIC BLOOD PRESSURE: 68 MMHG

## 2019-04-30 PROCEDURE — 93306 TTE W/DOPPLER COMPLETE: CPT

## 2019-04-30 PROCEDURE — 93000 ELECTROCARDIOGRAM COMPLETE: CPT

## 2019-04-30 PROCEDURE — 99214 OFFICE O/P EST MOD 30 MIN: CPT | Mod: 25

## 2019-05-02 LAB
ESTIMATED AVERAGE GLUCOSE: 97 MG/DL
HBA1C MFR BLD HPLC: 5 %

## 2019-05-12 ENCOUNTER — NON-APPOINTMENT (OUTPATIENT)
Age: 49
End: 2019-05-12

## 2019-05-12 LAB
APPEARANCE: CLEAR
BACTERIA UR CULT: NORMAL
BILIRUBIN URINE: NEGATIVE
BLOOD URINE: NEGATIVE
COLOR: NORMAL
GLUCOSE QUALITATIVE U: NEGATIVE
KETONES URINE: NEGATIVE
LEUKOCYTE ESTERASE URINE: NEGATIVE
NITRITE URINE: NEGATIVE
PH URINE: 6
PROTEIN URINE: NEGATIVE
SPECIFIC GRAVITY URINE: 1.01
UROBILINOGEN URINE: NORMAL

## 2019-05-12 NOTE — DISCUSSION/SUMMARY
[FreeTextEntry1] : IMPRESSION: Mr. Aiken is a 48 year old man with a history of coronary artery disease status post RESOLUTE JOHN to the mid RCA and mid LAD 1/2016, hypertension, hyperlipidemia, family history of coronary artery disease, bicuspid aortic valve with aortic stenosis, ascending aortic aneurysm, mediastinal hematoma (since resolved) who presents today for follow up of coronary artery disease and HTN. \par \par PLAN:\par 1. His blood pressure is well controlled, thus he will continue on diet modification along with decreased alcohol intake. He will also continue on Amlodipine 5 mg twice and Toprol XL 25 mg daily. \par 2. I will be placing a 24 hour Holter monitor given his ectopy on exam as well as his SVT on his previous Holter monitor. He will also stay well hydrated.  \par 3. He denies any chest pain or dyspnea. He had a cardiac catheterization performed 11/2016 that revealed nonobstructive CAD. He will continue on ASA 81mg daily and Brilinta 60mg twice daily.  I will be checking a CBC to evaluate his platelets on dual antiplatelet therapy.\par 4. He will continue on Lipitor 20mg daily and I will be checking a CMP and lipid profile today. \par 5. He should have a repeat echocardiogram in about 6 months to follow up his bicuspid aortic valve and his aortic aneurysm.\par 6. His aortic root/ proximal ascending aorta were slightly larger on today's echo, however, they were stable on his Chest MRI  that was performed in December. Will discuss performing a CT of the Chest at the time of his next visit to follow up his aortic aneurysm.  \par 7. He will follow up with me in 3 months or sooner should he experience any symptoms in the interim.

## 2019-05-12 NOTE — CARDIOLOGY SUMMARY
[None] : no pulmonary hypertension [Mild] : mild mitral regurgitation [___] : [unfilled] [LVEF ___%] : LVEF [unfilled]%

## 2019-05-12 NOTE — HISTORY OF PRESENT ILLNESS
[FreeTextEntry1] : Patient is a 48 year old man with a history of coronary artery disease status post drug-eluting stents to the mid RCA and mid LAD 1/14/2016, hypertension, hyperlipidemia, family history of coronary artery disease, bicuspid aortic valve with aortic stenosis, ascending aortic aneurysm, and mediastinal hematoma (since resolved) who presents today for follow up of coronary artery disease and HTN. He states that he has tried watching his diet since his last visit with me and has decreased his alcohol intake. He otherwise denies any chest pain, dyspnea, palpitations, dizziness, or headaches.  He states that he has not experienced any significant palpitations since his last visit with me.

## 2019-05-12 NOTE — PHYSICAL EXAM
[General Appearance - Well Developed] : well developed [Well Groomed] : well groomed [Normal Appearance] : normal appearance [General Appearance - Well Nourished] : well nourished [No Deformities] : no deformities [General Appearance - In No Acute Distress] : no acute distress [Normal Conjunctiva] : the conjunctiva exhibited no abnormalities [No Oral Pallor] : no oral pallor [Normal Oral Mucosa] : normal oral mucosa [Normal Jugular Venous V Waves Present] : normal jugular venous V waves present [No Oral Cyanosis] : no oral cyanosis [Normal Jugular Venous A Waves Present] : normal jugular venous A waves present [Respiration, Rhythm And Depth] : normal respiratory rhythm and effort [No Jugular Venous Wall A Waves] : no jugular venous wall A waves [Exaggerated Use Of Accessory Muscles For Inspiration] : no accessory muscle use [Auscultation Breath Sounds / Voice Sounds] : lungs were clear to auscultation bilaterally [Bowel Sounds] : normal bowel sounds [Abdomen Tenderness] : non-tender [Abdomen Soft] : soft [Abnormal Walk] : normal gait [Gait - Sufficient For Exercise Testing] : the gait was sufficient for exercise testing [Nail Clubbing] : no clubbing of the fingernails [Cyanosis, Localized] : no localized cyanosis [Petechial Hemorrhages (___cm)] : no petechial hemorrhages [] : no rash [Skin Color & Pigmentation] : normal skin color and pigmentation [Mood] : the mood was normal [Affect] : the affect was normal [No Skin Ulcers] : no skin ulcer [Oriented To Time, Place, And Person] : oriented to person, place, and time [No Anxiety] : not feeling anxious [Normal Rate] : normal [Normal S2] : normal S2 [Normal S1] : normal S1 [I] : a grade 1 [II] : a grade 2 [No Pitting Edema] : no pitting edema present [FreeTextEntry1] : Extraocular muscles intact.  Anicteric sclerae. [Premature Beats] : regular with premature beats [S3] : no S3 [Right Carotid Bruit] : no bruit heard over the right carotid [Left Carotid Bruit] : no bruit heard over the left carotid [Bruit] : no bruit heard

## 2019-07-29 ENCOUNTER — APPOINTMENT (OUTPATIENT)
Dept: CARDIOLOGY | Facility: CLINIC | Age: 49
End: 2019-07-29
Payer: COMMERCIAL

## 2019-07-29 ENCOUNTER — NON-APPOINTMENT (OUTPATIENT)
Age: 49
End: 2019-07-29

## 2019-07-29 VITALS — SYSTOLIC BLOOD PRESSURE: 128 MMHG | DIASTOLIC BLOOD PRESSURE: 82 MMHG

## 2019-07-29 VITALS
DIASTOLIC BLOOD PRESSURE: 86 MMHG | WEIGHT: 180 LBS | OXYGEN SATURATION: 99 % | RESPIRATION RATE: 14 BRPM | SYSTOLIC BLOOD PRESSURE: 135 MMHG | HEIGHT: 67 IN | HEART RATE: 75 BPM | BODY MASS INDEX: 28.25 KG/M2 | TEMPERATURE: 97.8 F

## 2019-07-29 LAB
ALBUMIN SERPL ELPH-MCNC: 5 G/DL
ALP BLD-CCNC: 65 U/L
ALT SERPL-CCNC: 38 U/L
ANION GAP SERPL CALC-SCNC: 12 MMOL/L
AST SERPL-CCNC: 26 U/L
BASOPHILS # BLD AUTO: 0.04 K/UL
BASOPHILS NFR BLD AUTO: 0.6 %
BILIRUB SERPL-MCNC: 1.2 MG/DL
BUN SERPL-MCNC: 12 MG/DL
CALCIUM SERPL-MCNC: 9.8 MG/DL
CHLORIDE SERPL-SCNC: 101 MMOL/L
CO2 SERPL-SCNC: 28 MMOL/L
CREAT SERPL-MCNC: 0.98 MG/DL
EOSINOPHIL # BLD AUTO: 0.13 K/UL
EOSINOPHIL NFR BLD AUTO: 2 %
GLUCOSE SERPL-MCNC: 79 MG/DL
HCT VFR BLD CALC: 49.9 %
HGB BLD-MCNC: 16.1 G/DL
IMM GRANULOCYTES NFR BLD AUTO: 0.2 %
LYMPHOCYTES # BLD AUTO: 1.62 K/UL
LYMPHOCYTES NFR BLD AUTO: 25.3 %
MAN DIFF?: NORMAL
MCHC RBC-ENTMCNC: 31.9 PG
MCHC RBC-ENTMCNC: 32.3 GM/DL
MCV RBC AUTO: 98.8 FL
MONOCYTES # BLD AUTO: 0.54 K/UL
MONOCYTES NFR BLD AUTO: 8.4 %
NEUTROPHILS # BLD AUTO: 4.06 K/UL
NEUTROPHILS NFR BLD AUTO: 63.5 %
PLATELET # BLD AUTO: 190 K/UL
POTASSIUM SERPL-SCNC: 4 MMOL/L
PROT SERPL-MCNC: 7.2 G/DL
RBC # BLD: 5.05 M/UL
RBC # FLD: 12.3 %
SODIUM SERPL-SCNC: 141 MMOL/L
TSH SERPL-ACNC: 1.79 UIU/ML
WBC # FLD AUTO: 6.4 K/UL

## 2019-07-29 PROCEDURE — 93000 ELECTROCARDIOGRAM COMPLETE: CPT

## 2019-07-29 PROCEDURE — 99214 OFFICE O/P EST MOD 30 MIN: CPT | Mod: 25

## 2019-08-04 ENCOUNTER — NON-APPOINTMENT (OUTPATIENT)
Age: 49
End: 2019-08-04

## 2019-08-04 LAB
25(OH)D3 SERPL-MCNC: 27 NG/ML
25(OH)D3 SERPL-MCNC: 39.9 NG/ML
ALBUMIN SERPL ELPH-MCNC: 4.7 G/DL
ALP BLD-CCNC: 57 U/L
ALT SERPL-CCNC: 45 U/L
ANION GAP SERPL CALC-SCNC: 11 MMOL/L
AST SERPL-CCNC: 28 U/L
BASOPHILS # BLD AUTO: 0.04 K/UL
BASOPHILS NFR BLD AUTO: 0.9 %
BILIRUB SERPL-MCNC: 1.3 MG/DL
BUN SERPL-MCNC: 13 MG/DL
CALCIUM SERPL-MCNC: 9.5 MG/DL
CHLORIDE SERPL-SCNC: 105 MMOL/L
CHOLEST SERPL-MCNC: 132 MG/DL
CHOLEST SERPL-MCNC: 151 MG/DL
CHOLEST/HDLC SERPL: 2.8 RATIO
CHOLEST/HDLC SERPL: 2.8 RATIO
CO2 SERPL-SCNC: 26 MMOL/L
CREAT SERPL-MCNC: 1 MG/DL
EOSINOPHIL # BLD AUTO: 0.11 K/UL
EOSINOPHIL NFR BLD AUTO: 2.4 %
GLUCOSE SERPL-MCNC: 89 MG/DL
HCT VFR BLD CALC: 45.7 %
HDLC SERPL-MCNC: 48 MG/DL
HDLC SERPL-MCNC: 54 MG/DL
HGB BLD-MCNC: 15.7 G/DL
IMM GRANULOCYTES NFR BLD AUTO: 0.2 %
LDLC SERPL CALC-MCNC: 64 MG/DL
LDLC SERPL CALC-MCNC: 77 MG/DL
LYMPHOCYTES # BLD AUTO: 1.02 K/UL
LYMPHOCYTES NFR BLD AUTO: 22.5 %
MAN DIFF?: NORMAL
MCHC RBC-ENTMCNC: 32 PG
MCHC RBC-ENTMCNC: 34.4 GM/DL
MCV RBC AUTO: 93.3 FL
MONOCYTES # BLD AUTO: 0.44 K/UL
MONOCYTES NFR BLD AUTO: 9.7 %
NEUTROPHILS # BLD AUTO: 2.92 K/UL
NEUTROPHILS NFR BLD AUTO: 64.3 %
PLATELET # BLD AUTO: 170 K/UL
POTASSIUM SERPL-SCNC: 4.5 MMOL/L
PROT SERPL-MCNC: 7 G/DL
RBC # BLD: 4.9 M/UL
RBC # FLD: 11.5 %
SODIUM SERPL-SCNC: 141 MMOL/L
TRIGL SERPL-MCNC: 101 MG/DL
TRIGL SERPL-MCNC: 102 MG/DL
TSH SERPL-ACNC: 1.85 UIU/ML
WBC # FLD AUTO: 4.54 K/UL

## 2019-08-04 NOTE — PHYSICAL EXAM
[General Appearance - Well Developed] : well developed [Normal Appearance] : normal appearance [Well Groomed] : well groomed [General Appearance - Well Nourished] : well nourished [No Deformities] : no deformities [General Appearance - In No Acute Distress] : no acute distress [Normal Conjunctiva] : the conjunctiva exhibited no abnormalities [Normal Oral Mucosa] : normal oral mucosa [No Oral Pallor] : no oral pallor [No Oral Cyanosis] : no oral cyanosis [Normal Jugular Venous A Waves Present] : normal jugular venous A waves present [Normal Jugular Venous V Waves Present] : normal jugular venous V waves present [No Jugular Venous Wall A Waves] : no jugular venous wall A waves [Respiration, Rhythm And Depth] : normal respiratory rhythm and effort [Exaggerated Use Of Accessory Muscles For Inspiration] : no accessory muscle use [Auscultation Breath Sounds / Voice Sounds] : lungs were clear to auscultation bilaterally [Bowel Sounds] : normal bowel sounds [Abdomen Soft] : soft [Abdomen Tenderness] : non-tender [Abnormal Walk] : normal gait [Gait - Sufficient For Exercise Testing] : the gait was sufficient for exercise testing [Nail Clubbing] : no clubbing of the fingernails [Cyanosis, Localized] : no localized cyanosis [Petechial Hemorrhages (___cm)] : no petechial hemorrhages [Skin Color & Pigmentation] : normal skin color and pigmentation [No Skin Ulcers] : no skin ulcer [] : no rash [Oriented To Time, Place, And Person] : oriented to person, place, and time [Affect] : the affect was normal [Mood] : the mood was normal [No Anxiety] : not feeling anxious [Normal Rate] : normal [Normal S1] : normal S1 [Normal S2] : normal S2 [II] : a grade 2 [I] : a grade 1 [No Pitting Edema] : no pitting edema present [FreeTextEntry1] : Extraocular muscles intact.  Anicteric sclerae. [Rhythm Regular] : regular [S3] : no S3 [Left Carotid Bruit] : no bruit heard over the left carotid [Right Carotid Bruit] : no bruit heard over the right carotid [Bruit] : no bruit heard

## 2019-08-04 NOTE — DISCUSSION/SUMMARY
[FreeTextEntry1] : IMPRESSION: Mr. Aiken is a 48 year old man with a history of coronary artery disease status post RESOLUTE JOHN to the mid RCA and mid LAD 1/2016, hypertension, hyperlipidemia, family history of coronary artery disease, bicuspid aortic valve with aortic stenosis, ascending aortic aneurysm, mediastinal hematoma (since resolved) who presents today for follow up of coronary artery disease and HTN. \par \par PLAN:\par 1. His blood pressure is well controlled, thus he will continue on diet modification along with decreased alcohol intake. He will also continue on Amlodipine 5 mg twice and Toprol XL 25 mg daily. \par 2. I will be placing a 2  day ZIO patch given the SVT that was seen on his previous Holter monitor. He will also stay well hydrated.  \par 3. He denies any chest pain or dyspnea. He had a cardiac catheterization performed 11/2016 that revealed nonobstructive CAD. He will continue on ASA 81mg daily and Brilinta 60mg twice daily.  I will be checking a CBC to evaluate his platelets on dual antiplatelet therapy.\par 4. He will continue on Lipitor 20mg daily and I will be checking a CMP and lipid profile today. \par 5. He should have a repeat echocardiogram in about 3 months to follow up his bicuspid aortic valve and his aortic aneurysm.\par 6. His aortic root/ proximal ascending aorta were slightly larger on his most recent echo, however, they were stable on his Chest MRI  that was performed in December. Will discuss performing a CT of the Chest at the time of his next visit to follow up his aortic aneurysm, depending on the results of his echocardiogram.  \par 7. He will follow up with me in 3 months or sooner should he experience any symptoms in the interim.

## 2019-08-04 NOTE — CARDIOLOGY SUMMARY
[LVEF ___%] : LVEF [unfilled]% [None] : no pulmonary hypertension [Mild] : mild mitral regurgitation [___] : [unfilled] [Normal] : normal

## 2019-08-04 NOTE — HISTORY OF PRESENT ILLNESS
[FreeTextEntry1] : Patient is a 48 year old man with a history of coronary artery disease status post drug-eluting stents to the mid RCA and mid LAD 1/14/2016, hypertension, hyperlipidemia, family history of coronary artery disease, bicuspid aortic valve with aortic stenosis, ascending aortic aneurysm, and mediastinal hematoma (since resolved) who presents today for follow up of coronary artery disease and HTN. He states that he has been watching his diet and has tried decreasing his alcohol intake. He otherwise denies any chest pain, dyspnea, palpitations, dizziness, or headaches.

## 2019-10-29 ENCOUNTER — APPOINTMENT (OUTPATIENT)
Dept: CARDIOLOGY | Facility: CLINIC | Age: 49
End: 2019-10-29

## 2019-11-05 ENCOUNTER — APPOINTMENT (OUTPATIENT)
Dept: CARDIOLOGY | Facility: CLINIC | Age: 49
End: 2019-11-05
Payer: COMMERCIAL

## 2019-11-05 ENCOUNTER — LABORATORY RESULT (OUTPATIENT)
Age: 49
End: 2019-11-05

## 2019-11-05 ENCOUNTER — NON-APPOINTMENT (OUTPATIENT)
Age: 49
End: 2019-11-05

## 2019-11-05 VITALS — DIASTOLIC BLOOD PRESSURE: 80 MMHG | SYSTOLIC BLOOD PRESSURE: 130 MMHG

## 2019-11-05 VITALS
DIASTOLIC BLOOD PRESSURE: 73 MMHG | HEART RATE: 64 BPM | SYSTOLIC BLOOD PRESSURE: 152 MMHG | BODY MASS INDEX: 28.72 KG/M2 | RESPIRATION RATE: 14 BRPM | OXYGEN SATURATION: 98 % | HEIGHT: 67 IN | WEIGHT: 183 LBS

## 2019-11-05 VITALS — DIASTOLIC BLOOD PRESSURE: 78 MMHG | SYSTOLIC BLOOD PRESSURE: 132 MMHG

## 2019-11-05 PROCEDURE — 99215 OFFICE O/P EST HI 40 MIN: CPT | Mod: 25

## 2019-11-05 PROCEDURE — 93000 ELECTROCARDIOGRAM COMPLETE: CPT

## 2019-11-07 DIAGNOSIS — M79.10 MYALGIA, UNSPECIFIED SITE: ICD-10-CM

## 2019-11-07 LAB
25(OH)D3 SERPL-MCNC: 36.6 NG/ML
ALBUMIN SERPL ELPH-MCNC: 5 G/DL
ALP BLD-CCNC: 62 U/L
ALT SERPL-CCNC: 33 U/L
ANA SER IF-ACNC: NEGATIVE
ANION GAP SERPL CALC-SCNC: 14 MMOL/L
APPEARANCE: CLEAR
AST SERPL-CCNC: 26 U/L
B BURGDOR AB SER-IMP: NEGATIVE
B BURGDOR IGM PATRN SER IB-IMP: NEGATIVE
B BURGDOR18KD IGG SER QL IB: NORMAL
B BURGDOR23KD IGG SER QL IB: NORMAL
B BURGDOR23KD IGM SER QL IB: NORMAL
B BURGDOR28KD IGG SER QL IB: NORMAL
B BURGDOR30KD IGG SER QL IB: NORMAL
B BURGDOR31KD IGG SER QL IB: NORMAL
B BURGDOR39KD IGG SER QL IB: NORMAL
B BURGDOR39KD IGM SER QL IB: NORMAL
B BURGDOR41KD IGG SER QL IB: PRESENT
B BURGDOR41KD IGM SER QL IB: NORMAL
B BURGDOR45KD IGG SER QL IB: NORMAL
B BURGDOR58KD IGG SER QL IB: NORMAL
B BURGDOR66KD IGG SER QL IB: NORMAL
B BURGDOR93KD IGG SER QL IB: NORMAL
BASOPHILS # BLD AUTO: 0.04 K/UL
BASOPHILS NFR BLD AUTO: 0.7 %
BILIRUB SERPL-MCNC: 1.6 MG/DL
BILIRUBIN URINE: NEGATIVE
BLOOD URINE: NEGATIVE
BUN SERPL-MCNC: 16 MG/DL
CALCIUM SERPL-MCNC: 9.7 MG/DL
CHLORIDE SERPL-SCNC: 102 MMOL/L
CHOLEST SERPL-MCNC: 150 MG/DL
CHOLEST/HDLC SERPL: 2.7 RATIO
CK SERPL-CCNC: 282 U/L
CO2 SERPL-SCNC: 23 MMOL/L
COLOR: COLORLESS
CREAT SERPL-MCNC: 1.08 MG/DL
CREAT SPEC-SCNC: 39 MG/DL
EOSINOPHIL # BLD AUTO: 0.33 K/UL
EOSINOPHIL NFR BLD AUTO: 6.1 %
ESTIMATED AVERAGE GLUCOSE: 94 MG/DL
GLUCOSE QUALITATIVE U: NEGATIVE
GLUCOSE SERPL-MCNC: 87 MG/DL
HBA1C MFR BLD HPLC: 4.9 %
HCT VFR BLD CALC: 48.8 %
HDLC SERPL-MCNC: 56 MG/DL
HGB BLD-MCNC: 16.5 G/DL
IMM GRANULOCYTES NFR BLD AUTO: 0.2 %
KETONES URINE: NEGATIVE
LDLC SERPL CALC-MCNC: 77 MG/DL
LEUKOCYTE ESTERASE URINE: NEGATIVE
LYMPHOCYTES # BLD AUTO: 1.28 K/UL
LYMPHOCYTES NFR BLD AUTO: 23.7 %
MAN DIFF?: NORMAL
MCHC RBC-ENTMCNC: 31.5 PG
MCHC RBC-ENTMCNC: 33.8 GM/DL
MCV RBC AUTO: 93.3 FL
MICROALBUMIN 24H UR DL<=1MG/L-MCNC: <1.2 MG/DL
MICROALBUMIN/CREAT 24H UR-RTO: NORMAL MG/G
MONOCYTES # BLD AUTO: 0.5 K/UL
MONOCYTES NFR BLD AUTO: 9.3 %
NEUTROPHILS # BLD AUTO: 3.23 K/UL
NEUTROPHILS NFR BLD AUTO: 60 %
NITRITE URINE: NEGATIVE
PH URINE: 6
PLATELET # BLD AUTO: 199 K/UL
POTASSIUM SERPL-SCNC: 4.6 MMOL/L
PROT SERPL-MCNC: 7.5 G/DL
PROTEIN URINE: NEGATIVE
RBC # BLD: 5.23 M/UL
RBC # FLD: 11.6 %
SODIUM SERPL-SCNC: 139 MMOL/L
SPECIFIC GRAVITY URINE: 1.01
TRIGL SERPL-MCNC: 86 MG/DL
TSH SERPL-ACNC: 1.7 UIU/ML
UROBILINOGEN URINE: NORMAL
WBC # FLD AUTO: 5.39 K/UL

## 2019-11-10 NOTE — REVIEW OF SYSTEMS
[Feeling Fatigued] : feeling fatigued [Palpitations] : palpitations [Negative] : Heme/Lymph [Joint Pain] : joint pain [Dyspnea on exertion] : dyspnea during exertion

## 2019-11-10 NOTE — DISCUSSION/SUMMARY
[FreeTextEntry1] : IMPRESSION: Mr. Aiken is a 49 year old man with a history of coronary artery disease status post RESOLUTE JOHN to the mid RCA and mid LAD 1/2016, hypertension, hyperlipidemia, family history of coronary artery disease, bicuspid aortic valve with aortic stenosis, ascending aortic aneurysm, mediastinal hematoma (since resolved) who presents today for follow up of coronary artery disease and HTN. \par \par PLAN:\par 1. Given his episodes of SVT on his event monitor and his palpitations, he will increase his Toprol XL to 37.5mg daily.\par 2. His blood pressure is well controlled, thus he will continue on diet modification along with decreased alcohol intake. He will also continue on Amlodipine 5 mg twice daily and Toprol XL. \par 3. We discussed an ischemic evaluation, however, he prefers to have an ischemic evaluation rather than a nuclear stress test. He had a cardiac catheterization performed 11/2016 that revealed nonobstructive CAD. He will continue on ASA 81mg daily and Brilinta 60mg twice daily.  I will be checking a CBC to evaluate his platelets on dual antiplatelet therapy.\par 4. He will continue on Lipitor 20mg daily and I will be checking a CMP and lipid profile today. \par 5. He will return later this month for a repeat echocardiogram to follow up his bicuspid aortic valve and his aortic aneurysm.\par 6. Will discuss performing a CT of the chest to further evaluate his aortic aneurysm depending on the results of his echocardiogram.  \par 7. Given his muscle aches I will be checking CPK and SAVANNAH\par 8. He will follow up with me in 3 months or sooner should he experience any new or worsening symptoms in the interim.\par 9. I spent approximately 40 minutes with the patient and more than 50% of the time was spent counseling and coordinating care with regards to his CAD and palpitations.

## 2019-11-10 NOTE — HISTORY OF PRESENT ILLNESS
[FreeTextEntry1] : Patient is a 49 year old man with a history of coronary artery disease status post drug-eluting stents to the mid RCA and mid LAD 1/14/2016, hypertension, hyperlipidemia, family history of coronary artery disease, bicuspid aortic valve with aortic stenosis, ascending aortic aneurysm, and mediastinal hematoma (since resolved) who presents today for follow up of coronary artery disease and HTN. He states that he continues to feel palpitations and an "erratic heartbeat".  He feels worsening exertional dyspnea with climbing stairs, as well as difficulty breathing when he is lying on his stomach. He also reports muscle aches. He walks for three miles each day and feels well when he is walking. He otherwise denies any chest pain, dizziness, or headaches.

## 2019-11-10 NOTE — PHYSICAL EXAM
[General Appearance - Well Developed] : well developed [Normal Appearance] : normal appearance [Well Groomed] : well groomed [General Appearance - Well Nourished] : well nourished [No Deformities] : no deformities [General Appearance - In No Acute Distress] : no acute distress [Normal Conjunctiva] : the conjunctiva exhibited no abnormalities [Normal Oral Mucosa] : normal oral mucosa [No Oral Pallor] : no oral pallor [No Oral Cyanosis] : no oral cyanosis [Normal Jugular Venous A Waves Present] : normal jugular venous A waves present [Normal Jugular Venous V Waves Present] : normal jugular venous V waves present [Respiration, Rhythm And Depth] : normal respiratory rhythm and effort [Exaggerated Use Of Accessory Muscles For Inspiration] : no accessory muscle use [Auscultation Breath Sounds / Voice Sounds] : lungs were clear to auscultation bilaterally [Normal Rate] : normal [Rhythm Regular] : regular [Normal S1] : normal S1 [Normal S2] : normal S2 [II] : a grade 2 [No Pitting Edema] : no pitting edema present [Abdomen Soft] : soft [Abdomen Tenderness] : non-tender [Abnormal Walk] : normal gait [Gait - Sufficient For Exercise Testing] : the gait was sufficient for exercise testing [Nail Clubbing] : no clubbing of the fingernails [Cyanosis, Localized] : no localized cyanosis [Petechial Hemorrhages (___cm)] : no petechial hemorrhages [Skin Color & Pigmentation] : normal skin color and pigmentation [] : no rash [Skin Lesions] : no skin lesions [Oriented To Time, Place, And Person] : oriented to person, place, and time [Affect] : the affect was normal [Mood] : the mood was normal [No Anxiety] : not feeling anxious [FreeTextEntry1] : Extraocular muscles intact. Anicteric sclerae. [Right Carotid Bruit] : no bruit heard over the right carotid [Left Carotid Bruit] : no bruit heard over the left carotid [Bruit] : no bruit heard [Bowel Sounds] : normal bowel sounds [No Skin Ulcers] : no skin ulcer

## 2019-11-21 LAB
ALBUMIN SERPL ELPH-MCNC: 4.6 G/DL
ALP BLD-CCNC: 63 U/L
ALT SERPL-CCNC: 51 U/L
ANION GAP SERPL CALC-SCNC: 15 MMOL/L
AST SERPL-CCNC: 29 U/L
BILIRUB SERPL-MCNC: 1.1 MG/DL
BUN SERPL-MCNC: 17 MG/DL
CALCIUM SERPL-MCNC: 9.4 MG/DL
CHLORIDE SERPL-SCNC: 102 MMOL/L
CK MB BLD-MCNC: 2.3 NG/ML
CK SERPL-CCNC: 171 U/L
CO2 SERPL-SCNC: 24 MMOL/L
CREAT SERPL-MCNC: 1.12 MG/DL
GLUCOSE SERPL-MCNC: 91 MG/DL
POTASSIUM SERPL-SCNC: 4.2 MMOL/L
PROT SERPL-MCNC: 7.2 G/DL
SODIUM SERPL-SCNC: 141 MMOL/L

## 2019-11-25 ENCOUNTER — APPOINTMENT (OUTPATIENT)
Dept: CARDIOLOGY | Facility: CLINIC | Age: 49
End: 2019-11-25
Payer: COMMERCIAL

## 2019-11-25 PROCEDURE — 93306 TTE W/DOPPLER COMPLETE: CPT

## 2020-03-17 ENCOUNTER — NON-APPOINTMENT (OUTPATIENT)
Age: 50
End: 2020-03-17

## 2020-03-17 ENCOUNTER — APPOINTMENT (OUTPATIENT)
Dept: CARDIOLOGY | Facility: CLINIC | Age: 50
End: 2020-03-17
Payer: COMMERCIAL

## 2020-03-17 VITALS
HEART RATE: 75 BPM | BODY MASS INDEX: 29.19 KG/M2 | WEIGHT: 186 LBS | SYSTOLIC BLOOD PRESSURE: 136 MMHG | DIASTOLIC BLOOD PRESSURE: 78 MMHG | HEIGHT: 67 IN | OXYGEN SATURATION: 98 % | RESPIRATION RATE: 12 BRPM | TEMPERATURE: 97.8 F

## 2020-03-17 VITALS — DIASTOLIC BLOOD PRESSURE: 84 MMHG | SYSTOLIC BLOOD PRESSURE: 128 MMHG

## 2020-03-17 PROCEDURE — 99214 OFFICE O/P EST MOD 30 MIN: CPT | Mod: 25

## 2020-03-17 PROCEDURE — 93000 ELECTROCARDIOGRAM COMPLETE: CPT

## 2020-03-17 NOTE — CARDIOLOGY SUMMARY
[LVEF ___%] : LVEF [unfilled]% [None] : no pulmonary hypertension [Mild] : mild mitral regurgitation [___] : [unfilled] [___] : [unfilled]

## 2020-03-17 NOTE — REVIEW OF SYSTEMS
[Palpitations] : palpitations [Dyspnea on exertion] : dyspnea during exertion [Negative] : Constitutional

## 2020-03-17 NOTE — PHYSICAL EXAM
[General Appearance - Well Developed] : well developed [Normal Appearance] : normal appearance [Well Groomed] : well groomed [General Appearance - Well Nourished] : well nourished [No Deformities] : no deformities [General Appearance - In No Acute Distress] : no acute distress [Normal Conjunctiva] : the conjunctiva exhibited no abnormalities [Normal Oral Mucosa] : normal oral mucosa [No Oral Pallor] : no oral pallor [No Oral Cyanosis] : no oral cyanosis [Normal Jugular Venous A Waves Present] : normal jugular venous A waves present [Normal Jugular Venous V Waves Present] : normal jugular venous V waves present [Respiration, Rhythm And Depth] : normal respiratory rhythm and effort [Exaggerated Use Of Accessory Muscles For Inspiration] : no accessory muscle use [Auscultation Breath Sounds / Voice Sounds] : lungs were clear to auscultation bilaterally [Normal Rate] : normal [Rhythm Regular] : regular [Normal S1] : normal S1 [Normal S2] : normal S2 [II] : a grade 2 [No Pitting Edema] : no pitting edema present [Abdomen Soft] : soft [Abdomen Tenderness] : non-tender [Abnormal Walk] : normal gait [Gait - Sufficient For Exercise Testing] : the gait was sufficient for exercise testing [Nail Clubbing] : no clubbing of the fingernails [Cyanosis, Localized] : no localized cyanosis [Petechial Hemorrhages (___cm)] : no petechial hemorrhages [Skin Color & Pigmentation] : normal skin color and pigmentation [] : no rash [Oriented To Time, Place, And Person] : oriented to person, place, and time [Affect] : the affect was normal [Mood] : the mood was normal [No Anxiety] : not feeling anxious [FreeTextEntry1] : Extraocular muscles intact. Anicteric sclerae. [S3] : no S3 [I] : a grade 1 [Right Carotid Bruit] : no bruit heard over the right carotid [Left Carotid Bruit] : no bruit heard over the left carotid [Bruit] : no bruit heard [Bowel Sounds] : normal bowel sounds [No Skin Ulcers] : no skin ulcer

## 2020-03-17 NOTE — DISCUSSION/SUMMARY
----- Message from Umberto Jacobs sent at 2019  8:15 AM CDT -----  Contact: Patient  Trish Orozco  MRN: [id number]  : 1971  PCP: Alberto Joya  Home Phone      [phone]  Work Phone      Not on file.  Mobile          [phone]      MESSAGE: earache & sore throat -- has appt Thurs with Dr Joya -- offered appt with Dr Albarado today - requesting to speak with nurse Re: refill needed before booking    Call [phone]    PCP: Toan   [FreeTextEntry1] : IMPRESSION: Mr. Aiken is a 49 year old man with a history of coronary artery disease status post RESOLUTE JOHN to the mid RCA and mid LAD 1/2016, hypertension, hyperlipidemia, family history of coronary artery disease, bicuspid aortic valve with aortic stenosis, ascending aortic aneurysm, mediastinal hematoma (since resolved) who presents today for follow up of coronary artery disease and HTN and evaluation of palpitations. \par \par PLAN:\par 1. Given his continued episodes of palpitations, he will increase his Toprol XL to 25mg twice daily. If his symptoms persist than we will consider another event monitor. I have asked him to stay well hydrated given his palpitations. \par 2. His blood pressure is well controlled, thus he will continue on diet modification along with decreased alcohol intake. He will also continue on Amlodipine 5 mg twice daily and Toprol XL. \par 3. We discussed an ischemic evaluation, however, we will wait to re-evaluate at the time of his next visit. He had a cardiac catheterization performed 11/2016 that revealed nonobstructive CAD. He will continue on ASA 81mg daily and Brilinta 60mg twice daily.  I will be checking a CBC to evaluate his platelets on dual antiplatelet therapy.\par 4. He will continue on Lipitor 20mg daily and I will be checking a CMP and lipid profile prior to his next visit. \par 5. He will follow up in 2 months or sooner should he experience any worsening symptoms in the interim.\par 6. Will discuss the timing of an echocardiogram, ischemic evaluation, and a CT of the chest to further evaluate his aortic aneurysm at that time.

## 2020-03-17 NOTE — REASON FOR VISIT
[Coronary Artery Disease] : coronary artery disease [Hypertension] : hypertension [Palpitations] : palpitations

## 2020-03-17 NOTE — HISTORY OF PRESENT ILLNESS
[FreeTextEntry1] : Patient is a 49 year old man with a history of coronary artery disease status post drug-eluting stents to the mid RCA and mid LAD 1/14/2016, hypertension, hyperlipidemia, family history of coronary artery disease, bicuspid aortic valve with aortic stenosis, ascending aortic aneurysm, and mediastinal hematoma (since resolved) who presents today for follow up of coronary artery disease and HTN and evaluation of palpitations. He has been feeling increased palpitations and flutters that have been worsening, over the past 2-3 weeks.  He has also noticed worsening exertional dyspnea, however, he is still able to perform all of his duties at work. He walks every day and feels well when he is walking. He otherwise denies any chest pain, dyspnea at rest, dizziness, or headaches.

## 2020-03-24 ENCOUNTER — APPOINTMENT (OUTPATIENT)
Dept: CARDIOLOGY | Facility: CLINIC | Age: 50
End: 2020-03-24

## 2020-05-30 LAB
BASOPHILS # BLD AUTO: 0.06 K/UL
BASOPHILS NFR BLD AUTO: 1.2 %
EOSINOPHIL # BLD AUTO: 0.27 K/UL
EOSINOPHIL NFR BLD AUTO: 5.2 %
HCT VFR BLD CALC: 48 %
HGB BLD-MCNC: 15.7 G/DL
IMM GRANULOCYTES NFR BLD AUTO: 0.2 %
LYMPHOCYTES # BLD AUTO: 1.53 K/UL
LYMPHOCYTES NFR BLD AUTO: 29.4 %
MAN DIFF?: NORMAL
MCHC RBC-ENTMCNC: 31.5 PG
MCHC RBC-ENTMCNC: 32.7 GM/DL
MCV RBC AUTO: 96.2 FL
MONOCYTES # BLD AUTO: 0.57 K/UL
MONOCYTES NFR BLD AUTO: 11 %
NEUTROPHILS # BLD AUTO: 2.76 K/UL
NEUTROPHILS NFR BLD AUTO: 53 %
PLATELET # BLD AUTO: 198 K/UL
RBC # BLD: 4.99 M/UL
RBC # FLD: 12 %
WBC # FLD AUTO: 5.2 K/UL

## 2020-06-02 ENCOUNTER — NON-APPOINTMENT (OUTPATIENT)
Age: 50
End: 2020-06-02

## 2020-06-02 ENCOUNTER — APPOINTMENT (OUTPATIENT)
Dept: CARDIOLOGY | Facility: CLINIC | Age: 50
End: 2020-06-02
Payer: COMMERCIAL

## 2020-06-02 VITALS
HEIGHT: 67 IN | RESPIRATION RATE: 12 BRPM | WEIGHT: 182 LBS | OXYGEN SATURATION: 96 % | BODY MASS INDEX: 28.56 KG/M2 | SYSTOLIC BLOOD PRESSURE: 122 MMHG | DIASTOLIC BLOOD PRESSURE: 76 MMHG | HEART RATE: 77 BPM

## 2020-06-02 PROCEDURE — 93000 ELECTROCARDIOGRAM COMPLETE: CPT

## 2020-06-02 PROCEDURE — 99214 OFFICE O/P EST MOD 30 MIN: CPT | Mod: 25

## 2020-06-07 ENCOUNTER — NON-APPOINTMENT (OUTPATIENT)
Age: 50
End: 2020-06-07

## 2020-06-07 NOTE — HISTORY OF PRESENT ILLNESS
[FreeTextEntry1] : Patient is a 49 year old man with a history of coronary artery disease status post drug-eluting stents to the mid RCA and mid LAD 1/14/2016, hypertension, hyperlipidemia, family history of coronary artery disease, bicuspid aortic valve with aortic stenosis, ascending aortic aneurysm, and mediastinal hematoma (since resolved) who presents today for follow up of coronary artery disease and HTN. He states that he still feels palpitations on occasion. he also feels a tightness in his chest, however, does not experience any limitations in his activities of daily living. He mentions experiencing dyspnea with exertion as well that has persisted. He otherwise denies any exertional chest pain, dyspnea at rest, dizziness, or headaches.

## 2020-06-07 NOTE — PHYSICAL EXAM
[General Appearance - Well Developed] : well developed [Normal Appearance] : normal appearance [Well Groomed] : well groomed [General Appearance - Well Nourished] : well nourished [No Deformities] : no deformities [General Appearance - In No Acute Distress] : no acute distress [Normal Conjunctiva] : the conjunctiva exhibited no abnormalities [FreeTextEntry1] : Extraocular muscles intact. Anicteric sclerae. [Normal Oral Mucosa] : normal oral mucosa [No Oral Pallor] : no oral pallor [No Oral Cyanosis] : no oral cyanosis [Normal Jugular Venous A Waves Present] : normal jugular venous A waves present [Normal Jugular Venous V Waves Present] : normal jugular venous V waves present [Respiration, Rhythm And Depth] : normal respiratory rhythm and effort [Exaggerated Use Of Accessory Muscles For Inspiration] : no accessory muscle use [Bowel Sounds] : normal bowel sounds [Auscultation Breath Sounds / Voice Sounds] : lungs were clear to auscultation bilaterally [Abdomen Soft] : soft [Abdomen Tenderness] : non-tender [Abnormal Walk] : normal gait [Nail Clubbing] : no clubbing of the fingernails [Gait - Sufficient For Exercise Testing] : the gait was sufficient for exercise testing [Cyanosis, Localized] : no localized cyanosis [Petechial Hemorrhages (___cm)] : no petechial hemorrhages [Skin Color & Pigmentation] : normal skin color and pigmentation [] : no rash [No Skin Ulcers] : no skin ulcer [Oriented To Time, Place, And Person] : oriented to person, place, and time [Affect] : the affect was normal [Mood] : the mood was normal [No Anxiety] : not feeling anxious [Normal Rate] : normal [Rhythm Regular] : regular [Normal S1] : normal S1 [Normal S2] : normal S2 [S3] : no S3 [I] : a grade 1 [II] : a grade 2 [Right Carotid Bruit] : no bruit heard over the right carotid [Left Carotid Bruit] : no bruit heard over the left carotid [Bruit] : no bruit heard [No Pitting Edema] : no pitting edema present

## 2020-06-07 NOTE — DISCUSSION/SUMMARY
[FreeTextEntry1] : IMPRESSION: Mr. Aiken is a 49 year old man with a history of coronary artery disease status post RESOLUTE JOHN to the mid RCA and mid LAD 1/2016, hypertension, hyperlipidemia, family history of coronary artery disease, bicuspid aortic valve with aortic stenosis, ascending aortic aneurysm, mediastinal hematoma (since resolved) who presents today for follow up of coronary artery disease and HTN. \par \par PLAN:\par 1. We discussed the possibility of a repeat Holter or event monitor given his palpitations. We will hold off at this time, unless his symptoms worse. For now, he will continue on Toprol XL 25mg twice daily. I have asked him to stay well hydrated given his palpitations.\par 2. His blood pressure is well controlled, thus he will continue on diet modification along with decreased alcohol intake. He will also continue on Amlodipine 5 mg twice daily and Toprol XL. \par 3. We discussed an ischemic evaluation given his dyspnea with exertion and chest pain. He had a cardiac catheterization performed 11/2016 that revealed nonobstructive CAD. He will continue on ASA 81mg daily and Brilinta 60mg twice daily.  Given the Covid pandemic will hold off for now, unless there are any abnormalities on his echocardiogram.\par 4. He will continue on Lipitor 20mg daily given his hyperlipidemia. His most recent LDL was very good, however, his triglycerides were elevated for which he will modify his diet. His LFTs were also normal on his most recent blood work. \par 5. He will schedule an echocardiogram to follow up his aortic stenosis and his aortic aneurysm. \par 6. He will follow up with me in 4 months or sooner should he experience any worsening symptoms in the interim or should there be any changes on his echo.

## 2020-06-07 NOTE — CARDIOLOGY SUMMARY
[Normal] : normal [LVEF ___%] : LVEF [unfilled]% [None] : no pulmonary hypertension [Mild] : mild mitral regurgitation [___] : [unfilled] [___] : [unfilled]

## 2020-06-08 ENCOUNTER — APPOINTMENT (OUTPATIENT)
Dept: CARDIOLOGY | Facility: CLINIC | Age: 50
End: 2020-06-08
Payer: COMMERCIAL

## 2020-06-08 PROCEDURE — 93306 TTE W/DOPPLER COMPLETE: CPT

## 2020-10-06 ENCOUNTER — APPOINTMENT (OUTPATIENT)
Dept: CARDIOLOGY | Facility: CLINIC | Age: 50
End: 2020-10-06
Payer: COMMERCIAL

## 2020-10-06 ENCOUNTER — NON-APPOINTMENT (OUTPATIENT)
Age: 50
End: 2020-10-06

## 2020-10-06 VITALS
HEART RATE: 70 BPM | OXYGEN SATURATION: 97 % | HEIGHT: 67 IN | TEMPERATURE: 97.3 F | WEIGHT: 184 LBS | RESPIRATION RATE: 12 BRPM | BODY MASS INDEX: 28.88 KG/M2 | DIASTOLIC BLOOD PRESSURE: 88 MMHG | SYSTOLIC BLOOD PRESSURE: 142 MMHG

## 2020-10-06 VITALS — SYSTOLIC BLOOD PRESSURE: 118 MMHG | DIASTOLIC BLOOD PRESSURE: 82 MMHG

## 2020-10-06 DIAGNOSIS — Z23 ENCOUNTER FOR IMMUNIZATION: ICD-10-CM

## 2020-10-06 LAB
25(OH)D3 SERPL-MCNC: 33.6 NG/ML
ALBUMIN SERPL ELPH-MCNC: 4.8 G/DL
ALP BLD-CCNC: 58 U/L
ALT SERPL-CCNC: 31 U/L
ANION GAP SERPL CALC-SCNC: 13 MMOL/L
AST SERPL-CCNC: 25 U/L
BILIRUB SERPL-MCNC: 1 MG/DL
BUN SERPL-MCNC: 17 MG/DL
CALCIUM SERPL-MCNC: 9.5 MG/DL
CHLORIDE SERPL-SCNC: 102 MMOL/L
CHOLEST SERPL-MCNC: 158 MG/DL
CHOLEST/HDLC SERPL: 3.3 RATIO
CK SERPL-CCNC: 157 U/L
CO2 SERPL-SCNC: 26 MMOL/L
CREAT SERPL-MCNC: 1.03 MG/DL
GLUCOSE SERPL-MCNC: 86 MG/DL
HDLC SERPL-MCNC: 48 MG/DL
LDLC SERPL CALC-MCNC: 67 MG/DL
POTASSIUM SERPL-SCNC: 4.7 MMOL/L
PROT SERPL-MCNC: 7.3 G/DL
SODIUM SERPL-SCNC: 141 MMOL/L
TRIGL SERPL-MCNC: 218 MG/DL
TSH SERPL-ACNC: 2.63 UIU/ML

## 2020-10-06 PROCEDURE — 93000 ELECTROCARDIOGRAM COMPLETE: CPT

## 2020-10-06 PROCEDURE — 90682 RIV4 VACC RECOMBINANT DNA IM: CPT

## 2020-10-06 PROCEDURE — G0008: CPT

## 2020-10-06 PROCEDURE — 99214 OFFICE O/P EST MOD 30 MIN: CPT | Mod: 25

## 2020-10-11 NOTE — CARDIOLOGY SUMMARY
[LVEF ___%] : LVEF [unfilled]% [None] : no pulmonary hypertension [Mild] : mild mitral regurgitation [___] : [unfilled] [Normal] : normal [___] : [unfilled]

## 2020-10-11 NOTE — REVIEW OF SYSTEMS
[Dyspnea on exertion] : dyspnea during exertion [Chest Pain] : chest pain [Palpitations] : palpitations [Negative] : Heme/Lymph

## 2020-10-11 NOTE — DISCUSSION/SUMMARY
[FreeTextEntry1] : IMPRESSION: Mr. Aiken is a 50 year old man with a history of coronary artery disease status post RESOLUTE JOHN to the mid RCA and mid LAD 1/2016, hypertension, hyperlipidemia, family history of coronary artery disease, bicuspid aortic valve with aortic stenosis, ascending aortic aneurysm, mediastinal hematoma (since resolved) who presents today for follow up of coronary artery disease and HTN. \par \par PLAN:\par 1. We discussed the possibility of a repeat Holter or event monitor given his palpitations. We will hold off at this time, unless his symptoms worsen. For now, he will continue on Toprol XL 25mg twice daily. I have asked him to stay well hydrated given his palpitations.\par 2. His blood pressure is well controlled, thus he will continue on diet modification along with decreased alcohol intake. He will also continue on Amlodipine 5 mg twice daily and Toprol XL. \par 3. We discussed an ischemic evaluation given his dyspnea with exertion and chest pain. He had a cardiac catheterization performed 11/2016 that revealed nonobstructive CAD. He will continue on ASA 81mg daily and Brilinta 60mg twice daily.  I will be checking a CBC today. He will schedule an MRA of the chest to evaluate his aortic aneurysm. He will likely then have a left heart cath to evaluate his CAD, which we will discuss further after his MRA. \par 4. He will continue on Lipitor 20mg daily given his hyperlipidemia. His most recent LDL was very good, however, his triglycerides were elevated for which he will modify his diet. His LFTs were also normal on his most recent blood work.  I will be checking a lipid profile and CMP today.\par 5. He will follow up with me in 4 months or sooner should he experience any worsening symptoms in the interim or should there be any changes on his cardiac studies.\par 6. He was given a flu shot today.

## 2020-10-11 NOTE — HISTORY OF PRESENT ILLNESS
[FreeTextEntry1] : Patient is a 50 year old man with a history of coronary artery disease status post drug-eluting stents to the mid RCA and mid LAD 1/14/2016, hypertension, hyperlipidemia, family history of coronary artery disease, bicuspid aortic valve with aortic stenosis, ascending aortic aneurysm, and mediastinal hematoma (since resolved) who presents today for follow up of coronary artery disease and HTN. He states that he still feels palpitations on occasion. He also feels a tightness in his chest, however, does not experience any limitations in his activities of daily living. He mentions experiencing dyspnea with exertion, but states that he walks a little over 3 miles a day. He denies any exertional chest pain, dyspnea at rest, headaches, and dizziness. He states that he just feels that something may be wrong.

## 2020-10-11 NOTE — PHYSICAL EXAM
[General Appearance - Well Developed] : well developed [Normal Appearance] : normal appearance [Well Groomed] : well groomed [General Appearance - Well Nourished] : well nourished [No Deformities] : no deformities [General Appearance - In No Acute Distress] : no acute distress [Normal Conjunctiva] : the conjunctiva exhibited no abnormalities [Normal Jugular Venous A Waves Present] : normal jugular venous A waves present [Normal Jugular Venous V Waves Present] : normal jugular venous V waves present [Respiration, Rhythm And Depth] : normal respiratory rhythm and effort [Exaggerated Use Of Accessory Muscles For Inspiration] : no accessory muscle use [Auscultation Breath Sounds / Voice Sounds] : lungs were clear to auscultation bilaterally [Bowel Sounds] : normal bowel sounds [Abdomen Soft] : soft [Abdomen Tenderness] : non-tender [Abnormal Walk] : normal gait [Gait - Sufficient For Exercise Testing] : the gait was sufficient for exercise testing [Nail Clubbing] : no clubbing of the fingernails [Cyanosis, Localized] : no localized cyanosis [Petechial Hemorrhages (___cm)] : no petechial hemorrhages [Skin Color & Pigmentation] : normal skin color and pigmentation [] : no rash [No Skin Ulcers] : no skin ulcer [Oriented To Time, Place, And Person] : oriented to person, place, and time [Affect] : the affect was normal [Mood] : the mood was normal [No Anxiety] : not feeling anxious [Normal Rate] : normal [Rhythm Regular] : regular [Normal S1] : normal S1 [Normal S2] : normal S2 [I] : a grade 1 [II] : a grade 2 [No Pitting Edema] : no pitting edema present [FreeTextEntry1] : He was wearing a face mask during the examination.  [S3] : no S3 [Right Carotid Bruit] : no bruit heard over the right carotid [Left Carotid Bruit] : no bruit heard over the left carotid [Bruit] : no bruit heard

## 2020-10-14 ENCOUNTER — APPOINTMENT (OUTPATIENT)
Dept: UROLOGY | Facility: CLINIC | Age: 50
End: 2020-10-14
Payer: COMMERCIAL

## 2020-10-14 VITALS — TEMPERATURE: 97.6 F

## 2020-10-14 PROCEDURE — 99214 OFFICE O/P EST MOD 30 MIN: CPT

## 2020-10-14 NOTE — ADDENDUM
[FreeTextEntry1] : I, Patria Plasenciain, acted solely as a scribe for Dr. Maurice Champion on this date 10/14/2020.\par \par All medical record entries made by the Scribe were at my, Dr. Maurice Champion, direction and personally dictated by me on 10/14/2020. I have reviewed the chart and agree that the record accurately reflects my personal performance of the history, physical exam, assessment and plan.  I have also personally directed, reviewed and agreed with the chart.

## 2020-10-14 NOTE — HISTORY OF PRESENT ILLNESS
[FreeTextEntry1] : Mr Aiken is a 47 year old man presenting for follow up of hematospermia, decreased libido, and BPH. I last saw the patient in May of 2013 for BPH symptoms. The patient's brother and sister-in-law are patient's of mine. In the past year the patient has been followed for a mediastinal mass consistent with resolving hematoma and ascending aortic aneurysm. He has had two cardiac stents placed. Scrotal US 12/02/16 did not find any testicular mass. Small left varicocele. The patient returns today and reports that his vitality level is fair. He reports his sexual performance is adequate. he also reports his urination is good. Daily, he reports he urinates 4-5 times. The patient drinks 3 quarts of water daily. He denies any straining, urinary urgency, dysuria, gross hematuria. The patient does not eat spicy food, but he does drink coffee throughout the week. I referred the patient to Dr. Mayur Knapp because he was interested in finding a new PCP.  The patients Aortic aneurysm is now 4.7.  The patients labs were reviewed and discussed in office and were all adequate. \par \par 10/14/2020: Patient presents today for follow up of elevated PSA. Patient is concerned as last blood work done by cardio on 10/6/20 showed PSA of 2.01 while his PSA typically runs around 0.50. Urination is good. Wakes 1-2x a night to urinate. H/o left testicular torsion. +FHx of testicular CA in brother. Works as .

## 2020-10-14 NOTE — REVIEW OF SYSTEMS
[Eyesight Problems] : eyesight problems [Joint Pain] : joint pain [Feeling Poorly] : not feeling poorly [Chest Pain] : no chest pain [Diarrhea] : no diarrhea [Constipation] : no constipation [Erectile Dysfunction] : no erectile dysfunction

## 2020-10-14 NOTE — ASSESSMENT
[FreeTextEntry1] : The patient is a 50 year old man presenting for follow up of hematospermia, decreased libido, and BPH. I last saw the patient in May of 2013 for BPH symptoms. The patient's brother and sister-in-law are patient's of mine. In the past year the patient has been followed for a mediastinal mass consistent with resolving hematoma and ascending aortic aneurysm. He has had two cardiac stents placed. Scrotal US 12/02/16 did not find any testicular mass. Small left varicocele. The patient returns today and reports that his vitality level is fair. He reports his sexual performance is adequate. he also reports his urination is good. Daily, he reports he urinates 4-5 times. The patient drinks 3 quarts of water daily. He denies any straining, urinary urgency, dysuria, gross hematuria. The patient does not eat spicy food, but he does drink coffee throughout the week. I referred the patient to Dr. Mayur Knpap because he was interested in finding a new PCP.  The patients Aortic aneurysm is now 4.7. The patient produced a urine sample which will be sent for urinalysis, urine cytology, and urine culture. Blood work today included PSA, \par Digital rectal exam found no suspicious rectal masses. Anal tone is normal. The prostate is non tender, with normal texture, discrete borders, and no nodules. It is a 12 gram transurethral resection size. The patient will return in 1 year for a follow up.\par \par 10/14/2020: Patient presents today for follow up of elevated PSA. Patient is concerned as last blood work done by cardio on 10/6/20 showed PSA of 2.01 while his PSA typically runs around 0.50. Urination is good. Wakes 1-2x a night to urinate. H/o left testicular torsion. +FHx of testicular CA in brother. Works as . \par \par Knee chest position was used. Digital rectal exam found no suspicious rectal masses. No rectal mucosal lesions. Anal tone is normal. The prostate is non tender, with normal texture, discrete borders, and no nodules. It is a 20 gram transurethral resection size. No gross blood on examining fingers. Small left varicocele appreciated on coughing. No right varicocele. Left testicle nontender, no intratesticular masses, 5 cc. Right testicle nontender, no intratesticular masses, 12 cc. Despite left testicle torsion, texture is normal.\par \par Blood work today includes PSA and testosterone. \par \par I prescribed the patient Bactrim x 2 weeks and will repeat PSA. If PSA is elevated, will consider prostate MRI.\par \par RTO in 3 months for follow up.

## 2020-10-14 NOTE — PHYSICAL EXAM
[Normal Appearance] : normal appearance [General Appearance - Well Developed] : well developed [General Appearance - In No Acute Distress] : no acute distress [Abdomen Soft] : soft [Abdomen Tenderness] : non-tender [] : no respiratory distress [Respiration, Rhythm And Depth] : normal respiratory rhythm and effort [Edema] : no peripheral edema [Exaggerated Use Of Accessory Muscles For Inspiration] : no accessory muscle use [Affect] : the affect was normal [Oriented To Time, Place, And Person] : oriented to person, place, and time [Mood] : the mood was normal [Normal Station and Gait] : the gait and station were normal for the patient's age [Not Anxious] : not anxious [No Focal Deficits] : no focal deficits [Urethral Meatus] : meatus normal [Penis Abnormality] : normal circumcised penis [Rectal Exam - Seminal Vesicles] : the seminal vesicles were normal [Skin Color & Pigmentation] : normal skin color and pigmentation [Heart Rate And Rhythm] : Heart rate and rhythm were normal [Femoral Lymph Nodes Enlarged Bilaterally] : femoral [Inguinal Lymph Nodes Enlarged Bilaterally] : inguinal [FreeTextEntry1] : femoral pulses 2+ bilaterally

## 2020-10-18 LAB
PSA SERPL-MCNC: 1.48 NG/ML
TESTOST SERPL-MCNC: 455 NG/DL

## 2020-10-31 ENCOUNTER — RX RENEWAL (OUTPATIENT)
Age: 50
End: 2020-10-31

## 2020-11-02 LAB — PSA SERPL-MCNC: 1.25 NG/ML

## 2020-11-09 ENCOUNTER — APPOINTMENT (OUTPATIENT)
Dept: MRI IMAGING | Facility: CLINIC | Age: 50
End: 2020-11-09
Payer: COMMERCIAL

## 2020-11-09 ENCOUNTER — OUTPATIENT (OUTPATIENT)
Dept: OUTPATIENT SERVICES | Facility: HOSPITAL | Age: 50
LOS: 1 days | End: 2020-11-09
Payer: COMMERCIAL

## 2020-11-09 DIAGNOSIS — N44.00 TORSION OF TESTIS, UNSPECIFIED: Chronic | ICD-10-CM

## 2020-11-09 DIAGNOSIS — I71.1 THORACIC AORTIC ANEURYSM, RUPTURED: ICD-10-CM

## 2020-11-09 DIAGNOSIS — Z98.890 OTHER SPECIFIED POSTPROCEDURAL STATES: Chronic | ICD-10-CM

## 2020-11-09 PROCEDURE — C8911: CPT

## 2020-11-09 PROCEDURE — A9585: CPT

## 2020-11-09 PROCEDURE — 71555 MRI ANGIO CHEST W OR W/O DYE: CPT | Mod: 26

## 2020-11-11 ENCOUNTER — APPOINTMENT (OUTPATIENT)
Dept: CARDIOTHORACIC SURGERY | Facility: CLINIC | Age: 50
End: 2020-11-11
Payer: COMMERCIAL

## 2020-11-11 VITALS
TEMPERATURE: 97.4 F | WEIGHT: 184 LBS | DIASTOLIC BLOOD PRESSURE: 76 MMHG | RESPIRATION RATE: 16 BRPM | BODY MASS INDEX: 28.88 KG/M2 | OXYGEN SATURATION: 99 % | HEIGHT: 67 IN | SYSTOLIC BLOOD PRESSURE: 145 MMHG | HEART RATE: 70 BPM

## 2020-11-11 DIAGNOSIS — R00.2 PALPITATIONS: ICD-10-CM

## 2020-11-11 PROCEDURE — 99072 ADDL SUPL MATRL&STAF TM PHE: CPT

## 2020-11-11 PROCEDURE — 99213 OFFICE O/P EST LOW 20 MIN: CPT

## 2020-11-12 RX ORDER — SULFAMETHOXAZOLE AND TRIMETHOPRIM 800; 160 MG/1; MG/1
800-160 TABLET ORAL
Qty: 28 | Refills: 0 | Status: COMPLETED | COMMUNITY
Start: 2020-10-14 | End: 2020-11-12

## 2020-11-12 RX ORDER — ASPIRIN 81 MG/1
81 TABLET, COATED ORAL
Qty: 90 | Refills: 0 | Status: COMPLETED | COMMUNITY
Start: 2020-10-31 | End: 2020-11-12

## 2020-11-18 NOTE — PHYSICAL EXAM
[Sclera] : the sclera and conjunctiva were normal [PERRL With Normal Accommodation] : pupils were equal in size, round, and reactive to light [Neck Appearance] : the appearance of the neck was normal [] : no respiratory distress [Respiration, Rhythm And Depth] : normal respiratory rhythm and effort [Auscultation Breath Sounds / Voice Sounds] : lungs were clear to auscultation bilaterally [Apical Impulse] : the apical impulse was normal [Heart Rate And Rhythm] : heart rate was normal and rhythm regular [Heart Sounds] : normal S1 and S2 [Examination Of The Chest] : the chest was normal in appearance [2+] : left 2+ [Breast Appearance] : normal in appearance [Bowel Sounds] : normal bowel sounds [Abdomen Soft] : soft [No CVA Tenderness] : no ~M costovertebral angle tenderness [Abnormal Walk] : normal gait [Involuntary Movements] : no involuntary movements were seen [Skin Color & Pigmentation] : normal skin color and pigmentation [Skin Turgor] : normal skin turgor [No Focal Deficits] : no focal deficits [Oriented To Time, Place, And Person] : oriented to person, place, and time [Impaired Insight] : insight and judgment were intact [Affect] : the affect was normal [Mood] : the mood was normal [Memory Recent] : recent memory was not impaired [Memory Remote] : remote memory was not impaired [Systolic grade ___/6] : A grade [unfilled]/6 systolic murmur was heard. [FreeTextEntry1] : Deferred

## 2020-11-18 NOTE — REVIEW OF SYSTEMS
[Palpitations] : palpitations [Dizziness] : dizziness [Negative] : Heme/Lymph [Feeling Tired] : not feeling tired [Chest Pain] : no chest pain [Lower Ext Edema] : no extremity edema [Shortness Of Breath] : no shortness of breath [SOB on Exertion] : no shortness of breath during exertion [Fainting] : no fainting [Easy Bleeding] : no tendency for easy bleeding [Easy Bruising] : no tendency for easy bruising

## 2020-11-18 NOTE — CONSULT LETTER
[FreeTextEntry2] : Randy Thomason MD \par [phone] [place] [address], [address]\[place] [id number] [FreeTextEntry1] : We take a multidisciplinary team approach to patient care and consider you, the referring physician, an extension of our team. We will maintain an open line of communication with you throughout your patient's treatment course. \par \par Mr. Aiken is a 48 year old male with past medical history to include hypertension, hyperlipidemia, recent diagnosis of mediastinal mass (being followed by Dr. Souza), presents for a follow up visit for evaluation and management of an aortic root and ascending aortic aneurysm. \par \par 6/8/20 TTE revealed EF 60%, minimal mitral regurgitation, calcified aortic valve probably bicuspid with decreased opening.Peak trans aortic gradient 19 mm hg, mean trans aortic gradient 13  mm hg, estimated MARY 1.3 sq cm consistent with moderate aortic stenosis. Moderate aortic root dilatation 4.6 cm at sinuses of Valsalva. proximal ascending aorta 4.8 cm . \par \par MRA of the Chest revealed 12/5/2018:\par -Sinuses of Valsalva: 43 mm\par -Ascending thoracic aorta: 44 mm, stable from 12/12/2016\par -Descending thoracic aorta: 22 mm\par \par I have reviewed the patient's medical records, diagnostic images during the time of this office consultation and have made the following recommendation.\par Plan:\par 1. [facility] up in [facility] 1 year with MRA chest \par 2. Continue medication regimen.\par 3. Follow up with cardiologist and PCP.\par 4. Blood pressure management.\par \par My office will assist the patient with the patient’s upcoming appointment and I will update you on the patient’s progress at that time.\par \par I have discussed with the patient that we will continue to monitor the patient’s aortic pathology closely at the [facility] for the [facility], that encompasses the entire health care system and is one of the largest in the nation at this point.\par \par \par I appreciate the opportunity to care for your patient at the [facility] for [facility] based at [place].  If there are any questions or concerns, please call me directly at [phone]. \par \par Sincerely, \par \par \par \par Michael Johansen M.D.\par Professor of Cardiovascular and Thoracic Surgery\par Minimally Invasive Valve Surgeon\par Director of Aortic Surgery, [facility]\par [address], [address], [place] [address]\par Cell: [phone]\par Email: [email or web address] \par \par Practice Manager: Ms. Liana Hernandez\par Email: [email or web address]\par Phone: [phone]\par \par For [facility]:\par Department of Cardiovascular and Thoracic Surgery\par [address], [place] [address]\par Office: [phone]\par Fax: [phone]\par \par For [facility]:\par Department of Cardiovascular and Thoracic Surgery\par [address] [place] [address], [address]\par Fax: [phone]\par Office: [phone]\par \par \par \par \par \par \par

## 2020-11-18 NOTE — ASSESSMENT
[FreeTextEntry1] : 48 year old male with past medical history to include hypertension, hyperlipidemia, recent diagnosis of mediastinal mass (being followed by Dr. Souza), presents for a follow up visit for evaluation and management of an aortic root and ascending aortic aneurysm. \par \par MRA of the Chest revealed 12/5/2018:\par -Sinuses of Valsalva: 43 mm\par -Ascending thoracic aorta: 44 mm, stable from 12/12/2016\par -Descending thoracic aorta: 22 mm\par \par 6/8/20 TTE revealed EF 60%, minimal mitral regurgitation, calcified aortic valve probably bicuspid with decreased opening.Peak trans aortic gradient 19 mm hg, mean trans aortic gradient 13  mm hg, estimated MARY 1.3 sq cm consistent with moderate aortic stenosis. Moderate aortic root dilatation 4.6 cm at sinuses of Valsalva. proximal ascending aorta 4.8 cm . \par \par 11/9/20 MRA Chest revealed mid ascending aorta measures up to 4.6 cm, previously 4.4 cm. \par \par I have reviewed the patient's medical records, diagnostic images during the time of this office consultation and have made the following recommendation. Review of the imaging shows his  aortic pathology has remained stable and does not require surgical intervention. He will be entered into the aortic registry surveillance program.\par \par Plan\par \par 1. Follow up in [facility] 1 year with MRA chest \par 2. Continue medication regimen.\par 3. Follow up with cardiologist and PCP.\par 4. Blood pressure management.\par \par \par \par \par \par \par

## 2020-11-18 NOTE — HISTORY OF PRESENT ILLNESS
[FreeTextEntry1] : 48 year old male with past medical history to include coronary artery disease status post drug-eluting stents to the mid RCA and mid LAD 1/14/2016, hypertension, hyperlipidemia, family history of coronary artery disease, bicuspid aortic valve with aortic stenosis, ascending aortic aneurysm, and mediastinal hematoma (since resolved) presents for a follow up visit for evaluation and management of an aortic root and ascending aortic aneurysm. \par \par This visit, he complaints of palpitations and occasional dizziness. \par \par 11/9/20 MRA Chest revealed mid ascending aorta measures up to 4.6 cm, previously 4.4 cm. \par \par 6/8/20 TTE revealed EF 60%, minimal mitral regurgitation, calcified aortic valve probably bicuspid with decreased opening.Peak trans aortic gradient 19 mm hg, mean trans aortic gradient 13  mm hg, estimated MARY 1.3 sq cm consistent with moderate aortic stenosis. Moderate aortic root dilatation 4.6 cm at sinuses of Valsalva. proximal ascending aorta 4.8 cm . \par \par \par MRA of the Chest revealed 12/5/2018:\par -Sinuses of Valsalva: 43 mm\par -Ascending thoracic aorta: 44 mm, stable from 12/12/2016\par -Descending thoracic aorta: 22 mm\par \par

## 2020-11-18 NOTE — END OF VISIT
[FreeTextEntry3] : Scribe Attestation:\par I personally scribed for CHULA ELIZABETH on Nov 11 2020  9:45AM . \par \par I personally performed the services described in the documentation, reviewed the documentation recorded by the scribe in my presence and it accurately and completely records my words and actions.\par \par \par \par \par \par Physician Attestation:\par Documented by FIDENCIO BARCENAS acting as a scribe for CHULA ELIZABETH 11/11/2020 . \par                 All medical record entries made by the Scribe were at my, CHULA ELIZABETH , direction and personally dictated by me on 11/11/2020 . I have reviewed the chart and agree that the record accurately reflects my personal performance of the history, physical exam, assessment and plan\par

## 2020-11-18 NOTE — DATA REVIEWED
[FreeTextEntry1] : CT chest 12/12/16 revealed: ascending aorta 4.3cm. MRI chest 12/28/16 revealed: aortic root measuring \par 4.3 cm, the ascending aorta 4.7 cm, the aortic arch 2.6 cm, and the descending aorta 2.5 cm.\par

## 2020-12-22 ENCOUNTER — RX RENEWAL (OUTPATIENT)
Age: 50
End: 2020-12-22

## 2021-01-16 LAB — PSA SERPL-MCNC: 0.76 NG/ML

## 2021-01-19 ENCOUNTER — APPOINTMENT (OUTPATIENT)
Dept: UROLOGY | Facility: CLINIC | Age: 51
End: 2021-01-19

## 2021-01-19 DIAGNOSIS — N13.8 BENIGN PROSTATIC HYPERPLASIA WITH LOWER URINARY TRACT SYMPMS: ICD-10-CM

## 2021-01-19 DIAGNOSIS — R97.20 ELEVATED PROSTATE, SPECIFIC ANTIGEN [PSA]: ICD-10-CM

## 2021-01-19 DIAGNOSIS — N40.1 BENIGN PROSTATIC HYPERPLASIA WITH LOWER URINARY TRACT SYMPMS: ICD-10-CM

## 2021-02-22 ENCOUNTER — APPOINTMENT (OUTPATIENT)
Dept: CARDIOLOGY | Facility: CLINIC | Age: 51
End: 2021-02-22
Payer: COMMERCIAL

## 2021-02-22 ENCOUNTER — NON-APPOINTMENT (OUTPATIENT)
Age: 51
End: 2021-02-22

## 2021-02-22 VITALS
HEART RATE: 76 BPM | BODY MASS INDEX: 29.66 KG/M2 | TEMPERATURE: 98.6 F | RESPIRATION RATE: 14 BRPM | DIASTOLIC BLOOD PRESSURE: 81 MMHG | OXYGEN SATURATION: 96 % | HEIGHT: 67 IN | SYSTOLIC BLOOD PRESSURE: 133 MMHG | WEIGHT: 189 LBS

## 2021-02-22 VITALS — SYSTOLIC BLOOD PRESSURE: 122 MMHG | DIASTOLIC BLOOD PRESSURE: 74 MMHG

## 2021-02-22 LAB
25(OH)D3 SERPL-MCNC: 38.9 NG/ML
25(OH)D3 SERPL-MCNC: 47.3 NG/ML
ALBUMIN SERPL ELPH-MCNC: 4.6 G/DL
ALBUMIN SERPL ELPH-MCNC: 5.1 G/DL
ALP BLD-CCNC: 66 U/L
ALP BLD-CCNC: 67 U/L
ALT SERPL-CCNC: 41 U/L
ALT SERPL-CCNC: 42 U/L
ANION GAP SERPL CALC-SCNC: 12 MMOL/L
ANION GAP SERPL CALC-SCNC: 13 MMOL/L
APPEARANCE: CLEAR
APPEARANCE: CLEAR
AST SERPL-CCNC: 27 U/L
AST SERPL-CCNC: 30 U/L
BASOPHILS # BLD AUTO: 0.04 K/UL
BASOPHILS # BLD AUTO: 0.07 K/UL
BASOPHILS NFR BLD AUTO: 0.7 %
BASOPHILS NFR BLD AUTO: 1.1 %
BILIRUB SERPL-MCNC: 1.2 MG/DL
BILIRUB SERPL-MCNC: 1.6 MG/DL
BILIRUBIN URINE: NEGATIVE
BILIRUBIN URINE: NEGATIVE
BLOOD URINE: NEGATIVE
BLOOD URINE: NEGATIVE
BUN SERPL-MCNC: 16 MG/DL
BUN SERPL-MCNC: 17 MG/DL
CALCIUM SERPL-MCNC: 9.7 MG/DL
CALCIUM SERPL-MCNC: 9.9 MG/DL
CHLORIDE SERPL-SCNC: 101 MMOL/L
CHLORIDE SERPL-SCNC: 102 MMOL/L
CHOLEST SERPL-MCNC: 142 MG/DL
CHOLEST SERPL-MCNC: 156 MG/DL
CHOLEST/HDLC SERPL: 2.7 RATIO
CK MB BLD-MCNC: 2.4 NG/ML
CK MB BLD-MCNC: 2.5 NG/ML
CK SERPL-CCNC: 169 U/L
CK SERPL-CCNC: 186 U/L
CO2 SERPL-SCNC: 24 MMOL/L
CO2 SERPL-SCNC: 25 MMOL/L
COLOR: COLORLESS
COLOR: COLORLESS
CREAT SERPL-MCNC: 1.02 MG/DL
CREAT SERPL-MCNC: 1.12 MG/DL
CREAT SPEC-SCNC: 23 MG/DL
EOSINOPHIL # BLD AUTO: 0.13 K/UL
EOSINOPHIL # BLD AUTO: 0.15 K/UL
EOSINOPHIL NFR BLD AUTO: 2.3 %
EOSINOPHIL NFR BLD AUTO: 2.4 %
ESTIMATED AVERAGE GLUCOSE: 97 MG/DL
ESTIMATED AVERAGE GLUCOSE: 97 MG/DL
GLUCOSE QUALITATIVE U: NEGATIVE
GLUCOSE QUALITATIVE U: NEGATIVE
GLUCOSE SERPL-MCNC: 102 MG/DL
GLUCOSE SERPL-MCNC: 88 MG/DL
HBA1C MFR BLD HPLC: 5 %
HBA1C MFR BLD HPLC: 5 %
HCT VFR BLD CALC: 47.6 %
HCT VFR BLD CALC: 48.5 %
HDLC SERPL-MCNC: 52 MG/DL
HDLC SERPL-MCNC: 55 MG/DL
HGB BLD-MCNC: 15.9 G/DL
HGB BLD-MCNC: 16 G/DL
IMM GRANULOCYTES NFR BLD AUTO: 0.3 %
IMM GRANULOCYTES NFR BLD AUTO: 0.4 %
KETONES URINE: NEGATIVE
KETONES URINE: NEGATIVE
LDLC SERPL CALC-MCNC: 64 MG/DL
LDLC SERPL CALC-MCNC: 74 MG/DL
LEUKOCYTE ESTERASE URINE: NEGATIVE
LEUKOCYTE ESTERASE URINE: NEGATIVE
LYMPHOCYTES # BLD AUTO: 1.04 K/UL
LYMPHOCYTES # BLD AUTO: 1.18 K/UL
LYMPHOCYTES NFR BLD AUTO: 18.1 %
LYMPHOCYTES NFR BLD AUTO: 18.9 %
MAN DIFF?: NORMAL
MAN DIFF?: NORMAL
MCHC RBC-ENTMCNC: 31.7 PG
MCHC RBC-ENTMCNC: 32 PG
MCHC RBC-ENTMCNC: 33 GM/DL
MCHC RBC-ENTMCNC: 33.4 GM/DL
MCV RBC AUTO: 95 FL
MCV RBC AUTO: 97 FL
MICROALBUMIN 24H UR DL<=1MG/L-MCNC: <1.2 MG/DL
MICROALBUMIN/CREAT 24H UR-RTO: NORMAL MG/G
MONOCYTES # BLD AUTO: 0.42 K/UL
MONOCYTES # BLD AUTO: 0.62 K/UL
MONOCYTES NFR BLD AUTO: 7.6 %
MONOCYTES NFR BLD AUTO: 9.5 %
NEUTROPHILS # BLD AUTO: 3.86 K/UL
NEUTROPHILS # BLD AUTO: 4.47 K/UL
NEUTROPHILS NFR BLD AUTO: 68.7 %
NEUTROPHILS NFR BLD AUTO: 70 %
NITRITE URINE: NEGATIVE
NITRITE URINE: NEGATIVE
NONHDLC SERPL-MCNC: 100 MG/DL
PH URINE: 6
PH URINE: 6
PLATELET # BLD AUTO: 190 K/UL
PLATELET # BLD AUTO: 203 K/UL
POTASSIUM SERPL-SCNC: 4 MMOL/L
POTASSIUM SERPL-SCNC: 4.3 MMOL/L
PROT SERPL-MCNC: 7.2 G/DL
PROT SERPL-MCNC: 7.4 G/DL
PROTEIN URINE: NEGATIVE
PROTEIN URINE: NEGATIVE
PSA SERPL-MCNC: 1.33 NG/ML
PSA SERPL-MCNC: 2.01 NG/ML
RBC # BLD: 5 M/UL
RBC # BLD: 5.01 M/UL
RBC # FLD: 11.9 %
RBC # FLD: 12 %
SARS-COV-2 IGG SERPL IA-ACNC: 0.07 INDEX
SARS-COV-2 IGG SERPL IA-ACNC: <3.8 AU/ML
SARS-COV-2 IGG SERPL QL IA: NEGATIVE
SARS-COV-2 IGG SERPL QL IA: NEGATIVE
SODIUM SERPL-SCNC: 138 MMOL/L
SODIUM SERPL-SCNC: 140 MMOL/L
SPECIFIC GRAVITY URINE: 1.01
SPECIFIC GRAVITY URINE: 1.01
TRIGL SERPL-MCNC: 127 MG/DL
TRIGL SERPL-MCNC: 129 MG/DL
TSH SERPL-ACNC: 1.4 UIU/ML
TSH SERPL-ACNC: 1.71 UIU/ML
UROBILINOGEN URINE: NORMAL
UROBILINOGEN URINE: NORMAL
WBC # FLD AUTO: 5.51 K/UL
WBC # FLD AUTO: 6.51 K/UL

## 2021-02-22 PROCEDURE — 99072 ADDL SUPL MATRL&STAF TM PHE: CPT

## 2021-02-22 PROCEDURE — 99214 OFFICE O/P EST MOD 30 MIN: CPT | Mod: 25

## 2021-02-22 PROCEDURE — 93000 ELECTROCARDIOGRAM COMPLETE: CPT

## 2021-02-22 RX ORDER — ADHESIVE TAPE 3"X 2.3 YD
50 MCG TAPE, NON-MEDICATED TOPICAL
Qty: 90 | Refills: 1 | Status: ACTIVE | COMMUNITY
Start: 2020-03-17 | End: 1900-01-01

## 2021-02-23 NOTE — HISTORY OF PRESENT ILLNESS
[FreeTextEntry1] : Patient is a 50 year old man with a history of coronary artery disease status post drug-eluting stents to the mid RCA and mid LAD 1/14/2016, hypertension, hyperlipidemia, family history of coronary artery disease, bicuspid aortic valve with aortic stenosis, ascending aortic aneurysm, and mediastinal hematoma (since resolved) who presents today for follow up of coronary artery disease and HTN. He continues to feel palpitations daily, which he states are unchanged from previous. The palpitations can happen at any point during the day, and last for seconds, with no aggravating or alleviating factors. He denies any chest pain, dyspnea at rest, headaches or dizziness. He followed  up with his cardiothoracic surgeon and was told his aneurysm was stable and to follow up in a year.

## 2021-02-23 NOTE — PHYSICAL EXAM
[General Appearance - Well Developed] : well developed [Normal Appearance] : normal appearance [Well Groomed] : well groomed [General Appearance - Well Nourished] : well nourished [No Deformities] : no deformities [General Appearance - In No Acute Distress] : no acute distress [Normal Conjunctiva] : the conjunctiva exhibited no abnormalities [Normal Jugular Venous A Waves Present] : normal jugular venous A waves present [Normal Jugular Venous V Waves Present] : normal jugular venous V waves present [Respiration, Rhythm And Depth] : normal respiratory rhythm and effort [Exaggerated Use Of Accessory Muscles For Inspiration] : no accessory muscle use [Auscultation Breath Sounds / Voice Sounds] : lungs were clear to auscultation bilaterally [Abdomen Soft] : soft [Abdomen Tenderness] : non-tender [Abnormal Walk] : normal gait [Gait - Sufficient For Exercise Testing] : the gait was sufficient for exercise testing [Nail Clubbing] : no clubbing of the fingernails [Cyanosis, Localized] : no localized cyanosis [Petechial Hemorrhages (___cm)] : no petechial hemorrhages [Skin Color & Pigmentation] : normal skin color and pigmentation [] : no rash [Oriented To Time, Place, And Person] : oriented to person, place, and time [Affect] : the affect was normal [Mood] : the mood was normal [No Anxiety] : not feeling anxious [Normal Rate] : normal [Rhythm Regular] : regular [Normal S1] : normal S1 [Normal S2] : normal S2 [II] : a grade 2 [No Pitting Edema] : no pitting edema present [I] : a grade 1 [Bowel Sounds] : normal bowel sounds [No Skin Ulcers] : no skin ulcer [FreeTextEntry1] : He was wearing a face mask during the examination.  [S3] : no S3 [Right Carotid Bruit] : no bruit heard over the right carotid [Left Carotid Bruit] : no bruit heard over the left carotid [Bruit] : no bruit heard

## 2021-02-23 NOTE — DISCUSSION/SUMMARY
[FreeTextEntry1] : IMPRESSION: Mr. Aiken is a 50 year old man with a history of coronary artery disease status post RESOLUTE JOHN to the mid RCA and mid LAD 1/2016, hypertension, hyperlipidemia, family history of coronary artery disease, bicuspid aortic valve with aortic stenosis, ascending aortic aneurysm, mediastinal hematoma (since resolved) who presents today for follow up of coronary artery disease and HTN. \par \par PLAN:\par 1. Given his palpitations he will have a 24 hour Holter monitor. He will continue on Toprol XL 25mg twice daily for suppression of his ectopy. I have asked him to stay well hydrated given his palpitations.\par 2. His blood pressure is well controlled, thus he will continue on diet modification along with decreased alcohol intake. He will also continue on Amlodipine 5 mg twice daily and Toprol XL. \par 3. We discussed an ischemic evaluation given his dyspnea with exertion. He had a cardiac catheterization performed 11/2016 that revealed nonobstructive CAD. He will continue on ASA 81mg daily and Brilinta 60mg twice daily.  I will be checking a CBC today. For now he will schedule an echocardiogram to follow up his LV function, aortic stenosis, and aortic aneurysm. Will revisit an ischemic evaluation after his echo. \par 4. He will continue on Lipitor 20mg daily given his hyperlipidemia. His most recent lipid profile was very good.  I will be checking a lipid profile and CMP today.\par 5. He will follow up with me in 3 months or sooner should he experience any symptoms in the interim.\par 6. We spent approximately 35 minutes with the patient during this encounter as well as discussing his blood test results.

## 2021-02-26 ENCOUNTER — NON-APPOINTMENT (OUTPATIENT)
Age: 51
End: 2021-02-26

## 2021-05-24 ENCOUNTER — NON-APPOINTMENT (OUTPATIENT)
Age: 51
End: 2021-05-24

## 2021-05-24 ENCOUNTER — APPOINTMENT (OUTPATIENT)
Dept: CARDIOLOGY | Facility: CLINIC | Age: 51
End: 2021-05-24
Payer: COMMERCIAL

## 2021-05-24 VITALS
RESPIRATION RATE: 14 BRPM | OXYGEN SATURATION: 99 % | WEIGHT: 182 LBS | BODY MASS INDEX: 28.56 KG/M2 | HEIGHT: 67 IN | HEART RATE: 72 BPM | DIASTOLIC BLOOD PRESSURE: 88 MMHG | SYSTOLIC BLOOD PRESSURE: 138 MMHG | TEMPERATURE: 96.9 F

## 2021-05-24 VITALS — SYSTOLIC BLOOD PRESSURE: 122 MMHG | DIASTOLIC BLOOD PRESSURE: 76 MMHG

## 2021-05-24 LAB
BASOPHILS # BLD AUTO: 0.05 K/UL
BASOPHILS NFR BLD AUTO: 1 %
EOSINOPHIL # BLD AUTO: 0.17 K/UL
EOSINOPHIL NFR BLD AUTO: 3.5 %
HCT VFR BLD CALC: 48.3 %
HGB BLD-MCNC: 16.3 G/DL
IMM GRANULOCYTES NFR BLD AUTO: 0.4 %
LYMPHOCYTES # BLD AUTO: 1.01 K/UL
LYMPHOCYTES NFR BLD AUTO: 21 %
MAN DIFF?: NORMAL
MCHC RBC-ENTMCNC: 31.9 PG
MCHC RBC-ENTMCNC: 33.7 GM/DL
MCV RBC AUTO: 94.5 FL
MONOCYTES # BLD AUTO: 0.46 K/UL
MONOCYTES NFR BLD AUTO: 9.6 %
NEUTROPHILS # BLD AUTO: 3.1 K/UL
NEUTROPHILS NFR BLD AUTO: 64.5 %
PLATELET # BLD AUTO: 201 K/UL
RBC # BLD: 5.11 M/UL
RBC # FLD: 11.8 %
WBC # FLD AUTO: 4.81 K/UL

## 2021-05-24 PROCEDURE — 93000 ELECTROCARDIOGRAM COMPLETE: CPT | Mod: 59

## 2021-05-24 PROCEDURE — 99214 OFFICE O/P EST MOD 30 MIN: CPT | Mod: 25

## 2021-05-30 NOTE — HISTORY OF PRESENT ILLNESS
[FreeTextEntry1] : Patient is a 50 year old man with a history of coronary artery disease status post drug-eluting stents to the mid RCA and mid LAD 1/14/2016, hypertension, hyperlipidemia, family history of coronary artery disease, bicuspid aortic valve with aortic stenosis, ascending aortic aneurysm, and mediastinal hematoma (since resolved) who presents today for follow up of coronary artery disease and HTN. He states that he continues to experience palpitations daily, however, they appear to be improved and do not affect his activities of daily living.  He otherwise denies any chest pain, dyspnea at rest, headaches or dizziness. He followed  up with his cardiothoracic surgeon and was told his aneurysm was stable and to follow up in a year.

## 2021-05-30 NOTE — REVIEW OF SYSTEMS
[Negative] : Heme/Lymph [SOB] : no shortness of breath [Chest Discomfort] : no chest discomfort [Palpitations] : palpitations

## 2021-05-30 NOTE — PHYSICAL EXAM
[Normal Appearance] : normal appearance [General Appearance - Well Developed] : well developed [Well Groomed] : well groomed [General Appearance - Well Nourished] : well nourished [No Deformities] : no deformities [General Appearance - In No Acute Distress] : no acute distress [Normal Conjunctiva] : the conjunctiva exhibited no abnormalities [Normal Jugular Venous A Waves Present] : normal jugular venous A waves present [Normal Jugular Venous V Waves Present] : normal jugular venous V waves present [Respiration, Rhythm And Depth] : normal respiratory rhythm and effort [Exaggerated Use Of Accessory Muscles For Inspiration] : no accessory muscle use [Auscultation Breath Sounds / Voice Sounds] : lungs were clear to auscultation bilaterally [Normal Rate] : normal [Rhythm Regular] : regular [Normal S1] : normal S1 [Normal S2] : normal S2 [I] : a grade 1 [II] : a grade 2 [No Pitting Edema] : no pitting edema present [Bowel Sounds] : normal bowel sounds [Abdomen Soft] : soft [Abdomen Tenderness] : non-tender [Abnormal Walk] : normal gait [Gait - Sufficient For Exercise Testing] : the gait was sufficient for exercise testing [Nail Clubbing] : no clubbing of the fingernails [Cyanosis, Localized] : no localized cyanosis [Petechial Hemorrhages (___cm)] : no petechial hemorrhages [Skin Color & Pigmentation] : normal skin color and pigmentation [] : no rash [No Skin Ulcers] : no skin ulcer [Oriented To Time, Place, And Person] : oriented to person, place, and time [Affect] : the affect was normal [Mood] : the mood was normal [No Anxiety] : not feeling anxious [FreeTextEntry1] : He was wearing a face mask during the examination.  [S3] : no S3 [Right Carotid Bruit] : no bruit heard over the right carotid [Left Carotid Bruit] : no bruit heard over the left carotid [Bruit] : no bruit heard

## 2021-05-30 NOTE — DISCUSSION/SUMMARY
[FreeTextEntry1] : IMPRESSION: Mr. Aiken is a 50 year old man with a history of coronary artery disease status post RESOLUTE JOHN to the mid RCA and mid LAD 1/2016, hypertension, hyperlipidemia, family history of coronary artery disease, bicuspid aortic valve with aortic stenosis, ascending aortic aneurysm, mediastinal hematoma (since resolved) who presents today for follow up of coronary artery disease and HTN. \par \par PLAN:\par 1. He had a 24 hour Holter monitor that did not reveal any significant ectopy. There was no ectopy on exam or on his ECG that was performed today.  He will continue on Toprol XL 25 mg twice daily for suppression of his ectopy. I have also asked him to continue tp stay well hydrated given his palpitations.\par 2. His blood pressure is well controlled, thus he will continue on diet modification along with decreased alcohol intake. He will also continue on Amlodipine 5 mg twice daily and Toprol XL. \par 3. We discussed an ischemic evaluation, however, given that he feels well, we will hold off at this time. He had a cardiac catheterization performed 11/2016 that revealed nonobstructive CAD. He will continue on ASA 81 mg daily and Brilinta 60 mg twice daily.  I will be checking a CBC today. He had an echocardiogram performed today that revealed normal LV systolic function and stable moderate aortic stenosis. His aortic aneurysm also appears to be unchanged. \par 4. He will continue on Lipitor 20 mg daily given his hyperlipidemia. I will be checking a lipid profile today in addition to a CMP.\par 5. He will follow up with me in 3-4 months or sooner should he experience any symptoms in the interim.

## 2021-05-30 NOTE — CARDIOLOGY SUMMARY
[LVEF ___%] : LVEF [unfilled]% [None] : no pulmonary hypertension [Mild] : mild mitral regurgitation [___] : [unfilled] [de-identified] : 5/24/2021: Sinus Rhythm at 63 bpm

## 2021-06-04 LAB
25(OH)D3 SERPL-MCNC: 38.6 NG/ML
ALBUMIN SERPL ELPH-MCNC: 4.6 G/DL
ALP BLD-CCNC: 64 U/L
ALT SERPL-CCNC: 37 U/L
ANION GAP SERPL CALC-SCNC: 17 MMOL/L
APPEARANCE: CLEAR
AST SERPL-CCNC: 36 U/L
BILIRUB SERPL-MCNC: 1.1 MG/DL
BILIRUBIN URINE: NEGATIVE
BLOOD URINE: NEGATIVE
BUN SERPL-MCNC: 15 MG/DL
CALCIUM SERPL-MCNC: 9.4 MG/DL
CHLORIDE SERPL-SCNC: 102 MMOL/L
CHOLEST SERPL-MCNC: 152 MG/DL
CK SERPL-CCNC: 215 U/L
CO2 SERPL-SCNC: 23 MMOL/L
COLOR: COLORLESS
CREAT SERPL-MCNC: 0.96 MG/DL
GLUCOSE QUALITATIVE U: NEGATIVE
GLUCOSE SERPL-MCNC: 85 MG/DL
HDLC SERPL-MCNC: 51 MG/DL
KETONES URINE: NEGATIVE
LDLC SERPL CALC-MCNC: 74 MG/DL
LEUKOCYTE ESTERASE URINE: NEGATIVE
NITRITE URINE: NEGATIVE
NONHDLC SERPL-MCNC: 101 MG/DL
PH URINE: 5.5
POTASSIUM SERPL-SCNC: 5 MMOL/L
PROT SERPL-MCNC: 7.4 G/DL
PROTEIN URINE: NEGATIVE
PSA SERPL-MCNC: 0.83 NG/ML
SODIUM SERPL-SCNC: 141 MMOL/L
SPECIFIC GRAVITY URINE: 1
TRIGL SERPL-MCNC: 138 MG/DL
TSH SERPL-ACNC: 1.84 UIU/ML
UROBILINOGEN URINE: NORMAL

## 2021-09-01 ENCOUNTER — APPOINTMENT (OUTPATIENT)
Dept: CARDIOLOGY | Facility: CLINIC | Age: 51
End: 2021-09-01
Payer: COMMERCIAL

## 2021-09-01 ENCOUNTER — LABORATORY RESULT (OUTPATIENT)
Age: 51
End: 2021-09-01

## 2021-09-01 ENCOUNTER — NON-APPOINTMENT (OUTPATIENT)
Age: 51
End: 2021-09-01

## 2021-09-01 VITALS
WEIGHT: 181 LBS | OXYGEN SATURATION: 96 % | TEMPERATURE: 97.5 F | HEIGHT: 67 IN | RESPIRATION RATE: 12 BRPM | SYSTOLIC BLOOD PRESSURE: 136 MMHG | HEART RATE: 73 BPM | BODY MASS INDEX: 28.41 KG/M2 | DIASTOLIC BLOOD PRESSURE: 89 MMHG

## 2021-09-01 VITALS — DIASTOLIC BLOOD PRESSURE: 82 MMHG | SYSTOLIC BLOOD PRESSURE: 120 MMHG

## 2021-09-01 VITALS — SYSTOLIC BLOOD PRESSURE: 122 MMHG | DIASTOLIC BLOOD PRESSURE: 80 MMHG

## 2021-09-01 PROCEDURE — 99214 OFFICE O/P EST MOD 30 MIN: CPT | Mod: 25

## 2021-09-01 PROCEDURE — 93000 ELECTROCARDIOGRAM COMPLETE: CPT

## 2021-09-01 PROCEDURE — XXXXX: CPT

## 2021-09-14 LAB
25(OH)D3 SERPL-MCNC: 51.2 NG/ML
BASOPHILS # BLD AUTO: 0.06 K/UL
BASOPHILS NFR BLD AUTO: 1 %
CK SERPL-CCNC: 147 U/L
EOSINOPHIL # BLD AUTO: 0.16 K/UL
EOSINOPHIL NFR BLD AUTO: 2.7 %
HCT VFR BLD CALC: 51.3 %
HGB BLD-MCNC: 16.2 G/DL
IMM GRANULOCYTES NFR BLD AUTO: 0.3 %
LYMPHOCYTES # BLD AUTO: 1.15 K/UL
LYMPHOCYTES NFR BLD AUTO: 19.7 %
MAN DIFF?: NORMAL
MCHC RBC-ENTMCNC: 31.6 GM/DL
MCHC RBC-ENTMCNC: 31.8 PG
MCV RBC AUTO: 100.6 FL
MONOCYTES # BLD AUTO: 0.61 K/UL
MONOCYTES NFR BLD AUTO: 10.5 %
NEUTROPHILS # BLD AUTO: 3.83 K/UL
NEUTROPHILS NFR BLD AUTO: 65.8 %
PLATELET # BLD AUTO: 190 K/UL
RBC # BLD: 5.1 M/UL
RBC # FLD: 12.8 %
WBC # FLD AUTO: 5.83 K/UL

## 2021-10-28 ENCOUNTER — NON-APPOINTMENT (OUTPATIENT)
Age: 51
End: 2021-10-28

## 2021-11-10 ENCOUNTER — APPOINTMENT (OUTPATIENT)
Dept: CARDIOTHORACIC SURGERY | Facility: CLINIC | Age: 51
End: 2021-11-10

## 2021-12-01 ENCOUNTER — APPOINTMENT (OUTPATIENT)
Dept: CARDIOLOGY | Facility: CLINIC | Age: 51
End: 2021-12-01
Payer: COMMERCIAL

## 2021-12-01 ENCOUNTER — MED ADMIN CHARGE (OUTPATIENT)
Age: 51
End: 2021-12-01

## 2021-12-01 ENCOUNTER — NON-APPOINTMENT (OUTPATIENT)
Age: 51
End: 2021-12-01

## 2021-12-01 VITALS
HEART RATE: 75 BPM | TEMPERATURE: 97.1 F | RESPIRATION RATE: 12 BRPM | WEIGHT: 181 LBS | OXYGEN SATURATION: 96 % | SYSTOLIC BLOOD PRESSURE: 132 MMHG | DIASTOLIC BLOOD PRESSURE: 86 MMHG | BODY MASS INDEX: 28.41 KG/M2 | HEIGHT: 67 IN

## 2021-12-01 VITALS — SYSTOLIC BLOOD PRESSURE: 124 MMHG | DIASTOLIC BLOOD PRESSURE: 84 MMHG

## 2021-12-01 LAB
ALBUMIN SERPL ELPH-MCNC: 4.6 G/DL
ALP BLD-CCNC: 59 U/L
ALT SERPL-CCNC: 38 U/L
ANION GAP SERPL CALC-SCNC: 21 MMOL/L
APPEARANCE: CLEAR
AST SERPL-CCNC: 32 U/L
BILIRUB SERPL-MCNC: 1.4 MG/DL
BILIRUBIN URINE: NEGATIVE
BLOOD URINE: NEGATIVE
BUN SERPL-MCNC: 18 MG/DL
CALCIUM SERPL-MCNC: 9.8 MG/DL
CHLORIDE SERPL-SCNC: 101 MMOL/L
CHOLEST SERPL-MCNC: 155 MG/DL
CO2 SERPL-SCNC: 18 MMOL/L
COLOR: NORMAL
CREAT SERPL-MCNC: 1 MG/DL
ESTIMATED AVERAGE GLUCOSE: 103 MG/DL
GLUCOSE QUALITATIVE U: NEGATIVE
GLUCOSE SERPL-MCNC: 69 MG/DL
HBA1C MFR BLD HPLC: 5.2 %
HDLC SERPL-MCNC: 52 MG/DL
KETONES URINE: NEGATIVE
LDLC SERPL CALC-MCNC: 69 MG/DL
LEUKOCYTE ESTERASE URINE: NEGATIVE
NITRITE URINE: NEGATIVE
NONHDLC SERPL-MCNC: 103 MG/DL
PH URINE: 5
POTASSIUM SERPL-SCNC: 3.8 MMOL/L
PROT SERPL-MCNC: 7.6 G/DL
PROTEIN URINE: NEGATIVE
SODIUM SERPL-SCNC: 140 MMOL/L
SPECIFIC GRAVITY URINE: 1.01
TRIGL SERPL-MCNC: 171 MG/DL
TSH SERPL-ACNC: 2.11 UIU/ML
UROBILINOGEN URINE: NORMAL
VIT B12 SERPL-MCNC: 397 PG/ML

## 2021-12-01 PROCEDURE — 99214 OFFICE O/P EST MOD 30 MIN: CPT | Mod: 25

## 2021-12-01 PROCEDURE — 90682 RIV4 VACC RECOMBINANT DNA IM: CPT

## 2021-12-01 PROCEDURE — 90471 IMMUNIZATION ADMIN: CPT

## 2021-12-01 PROCEDURE — 93306 TTE W/DOPPLER COMPLETE: CPT

## 2021-12-01 PROCEDURE — 93000 ELECTROCARDIOGRAM COMPLETE: CPT

## 2021-12-25 ENCOUNTER — NON-APPOINTMENT (OUTPATIENT)
Age: 51
End: 2021-12-25

## 2021-12-25 NOTE — HISTORY OF PRESENT ILLNESS
[FreeTextEntry1] : Patient is a 51 year old man with a history of coronary artery disease status post drug-eluting stents to the mid RCA and mid LAD 1/14/2016, hypertension, hyperlipidemia, family history of coronary artery disease, bicuspid aortic valve with aortic stenosis, ascending aortic aneurysm, and mediastinal hematoma (since resolved) who presents today for follow up of coronary artery disease and HTN.  He has been feeling well, he denies any chest pain, shortness of breath, palpitations, and headaches. He does mention occasional episodes of dizziness. He walks daily with no exertional symptoms. He has not yet followed up with the cardiothoracic surgeon for management of his aneurysm and aortic stenosis.

## 2021-12-25 NOTE — DISCUSSION/SUMMARY
[FreeTextEntry1] : IMPRESSION: Mr. Aiken is a 51 year old man with a history of coronary artery disease status post RESOLUTE JOHN to the mid RCA and mid LAD 1/2016, hypertension, hyperlipidemia, family history of coronary artery disease, bicuspid aortic valve with aortic stenosis, ascending aortic aneurysm, mediastinal hematoma (since resolved) who presents today for follow up of coronary artery disease and HTN. \par \par PLAN:\par 1. He had a 24 hour Holter monitor that did not reveal any significant ectopy. There was no ectopy on exam or on his ECG that was performed today.  He will continue on Toprol XL 25 mg twice daily for suppression of his ectopy. \par 2. His blood pressure is well controlled, thus he will continue on diet modification along with decreased alcohol intake. He will also continue on Amlodipine 5 mg twice daily and Toprol XL. \par 3. We discussed an ischemic evaluation, however, given that he feels well, we will hold off at this time. He had a cardiac catheterization performed 11/2016 that revealed nonobstructive CAD. He will continue on ASA 81 mg daily and Brilinta 60 mg twice daily.  I will be checking a CBC today. His echocardiogram done today revealed normal LV systolic function, stable moderate aortic stenosis and a stable aortic aneurysm at 4.8cm. \par 4. He will continue on Lipitor 20 mg daily given his hyperlipidemia. I will be checking a lipid profile today in addition to a CMP.\par 5. He will schedule a carotid Doppler to follow up his mild stenosis and episodes of dizziness.\par 6. He will follow up with me in 3-4 months or sooner should he experience any symptoms in the interim.\par 7. He got the flu shot today.\par 8. He will schedule an MRA of the chest to follow up his dilated aorta.

## 2021-12-25 NOTE — CARDIOLOGY SUMMARY
[LVEF ___%] : LVEF [unfilled]% [None] : no pulmonary hypertension [Mild] : mild mitral regurgitation [___] : [unfilled] [de-identified] : 12/1/2021: Sinus Rhythm at 66 bpm\par  [de-identified] : 12/1/2021: Calcified bicuspid aortic valve. There is raphe formation of the right coronary cusp and left coronary cusp of the aortic valve. Peak transaortic valve gradient equals 21 mm Hg, mean transaortic valve gradient equals 12 mm Hg, estimated aortic valve area equals 1.3 sqcm (by continuity equation), consistent with moderate aortic stenosis. Mild aortic regurgitation. Mild to moderate aortic root dilatation  (Ao: 4.5 cm at the sinuses of Valsalva). The proximal ascending aorta is moderately dilated, measuring approximately 4.8 cm. Concentric left ventricular remodeling. Normal left ventricular systolic function. No segmental wall motion abnormalities. EF is approximately 60%. Mild diastolic dysfunction. Mild right atrial enlargement. Mild right ventricular enlargement with normal right ventricular systolic function. PASP= 29 mm Hg. Mild tricuspid regurgitation.

## 2022-01-02 ENCOUNTER — NON-APPOINTMENT (OUTPATIENT)
Age: 52
End: 2022-01-02

## 2022-01-02 NOTE — PHYSICAL EXAM
Can you send these in for her please    [General Appearance - Well Developed] : well developed [Normal Appearance] : normal appearance [Well Groomed] : well groomed [General Appearance - Well Nourished] : well nourished [No Deformities] : no deformities [General Appearance - In No Acute Distress] : no acute distress [Normal Conjunctiva] : the conjunctiva exhibited no abnormalities [Normal Jugular Venous A Waves Present] : normal jugular venous A waves present [Normal Jugular Venous V Waves Present] : normal jugular venous V waves present [Respiration, Rhythm And Depth] : normal respiratory rhythm and effort [Exaggerated Use Of Accessory Muscles For Inspiration] : no accessory muscle use [Auscultation Breath Sounds / Voice Sounds] : lungs were clear to auscultation bilaterally [Normal Rate] : normal [Rhythm Regular] : regular [Normal S1] : normal S1 [Normal S2] : normal S2 [I] : a grade 1 [II] : a grade 2 [No Pitting Edema] : no pitting edema present [Bowel Sounds] : normal bowel sounds [Abdomen Soft] : soft [Abdomen Tenderness] : non-tender [Abnormal Walk] : normal gait [Gait - Sufficient For Exercise Testing] : the gait was sufficient for exercise testing [Nail Clubbing] : no clubbing of the fingernails [Cyanosis, Localized] : no localized cyanosis [Petechial Hemorrhages (___cm)] : no petechial hemorrhages [Skin Color & Pigmentation] : normal skin color and pigmentation [] : no rash [No Skin Ulcers] : no skin ulcer [Oriented To Time, Place, And Person] : oriented to person, place, and time [Affect] : the affect was normal [Mood] : the mood was normal [No Anxiety] : not feeling anxious [FreeTextEntry1] : He was wearing a face mask during the examination.  [S3] : no S3 [Right Carotid Bruit] : no bruit heard over the right carotid [Left Carotid Bruit] : no bruit heard over the left carotid [Bruit] : no bruit heard

## 2022-01-02 NOTE — HISTORY OF PRESENT ILLNESS
[FreeTextEntry1] : Patient is a 51 year old man with a history of coronary artery disease status post drug-eluting stents to the mid RCA and mid LAD 1/14/2016, hypertension, hyperlipidemia, family history of coronary artery disease, bicuspid aortic valve with aortic stenosis, ascending aortic aneurysm, and mediastinal hematoma (since resolved) who presents today for follow up of coronary artery disease, hyperlipidemia, and HTN.  He states that he has been feeling relatively well denying any exertional chest pain, dyspnea at rest, headaches or dizziness. He states that he will be seeing his cardiothoracic surgeon around November. He walks about 4 miles/day with no exertional symptoms.

## 2022-01-02 NOTE — CARDIOLOGY SUMMARY
[LVEF ___%] : LVEF [unfilled]% [None] : no pulmonary hypertension [Mild] : mild mitral regurgitation [___] : [unfilled] [de-identified] : 9/1/2021: Sinus Rhythm at 66 bpm with a first degree AV block

## 2022-01-02 NOTE — DISCUSSION/SUMMARY
[FreeTextEntry1] : IMPRESSION: Mr. Aiken is a 51 year old man with a history of coronary artery disease status post RESOLUTE JOHN to the mid RCA and mid LAD 1/2016, hypertension, hyperlipidemia, family history of coronary artery disease, bicuspid aortic valve with aortic stenosis, ascending aortic aneurysm, mediastinal hematoma (since resolved) who presents today for follow up of coronary artery disease, hyperlipidemia, and HTN. \par \par PLAN:\par 1. He had a 24 hour Holter monitor that did not reveal any significant ectopy. There was no ectopy on exam or on his ECG that was performed today.  He will continue on Toprol XL 25 mg twice daily for suppression of his ectopy. I have also asked him to continue to stay well hydrated given his palpitations.\par 2. His blood pressure is well controlled, thus he will continue on diet modification along with decreased alcohol intake. He will also continue on Amlodipine 5 mg twice daily and Toprol XL. \par 3. We discussed an ischemic evaluation, however, given that he feels well, we will hold off at this time. He had a cardiac catheterization performed 11/2016 that revealed nonobstructive CAD. He will continue on ASA 81 mg daily and Brilinta 60 mg twice daily.  I will be checking a CBC today. He states that he will consider seeing his interventional cardiologist to discuss a possible left heart cath as he does not want a pharmacologic nuclear stress test. \par 4. He will be following up with his CT surgeon given his aortic aneurysm.\par 5. He will continue on Lipitor 20 mg daily given his hyperlipidemia. I will be checking a lipid profile today in addition to a CMP.\par 6. He will follow up with me in 3-4 months or sooner should he experience any symptoms in the interim.\par 7. He will schedule an echocardiogram at the time of his next visit to follow up his aortic aneurysm and aortic stenosis.

## 2022-03-03 ENCOUNTER — APPOINTMENT (OUTPATIENT)
Dept: CARDIOLOGY | Facility: CLINIC | Age: 52
End: 2022-03-03

## 2022-03-21 ENCOUNTER — APPOINTMENT (OUTPATIENT)
Dept: CARDIOLOGY | Facility: CLINIC | Age: 52
End: 2022-03-21
Payer: COMMERCIAL

## 2022-03-21 ENCOUNTER — NON-APPOINTMENT (OUTPATIENT)
Age: 52
End: 2022-03-21

## 2022-03-21 VITALS
HEART RATE: 65 BPM | RESPIRATION RATE: 12 BRPM | WEIGHT: 181 LBS | SYSTOLIC BLOOD PRESSURE: 129 MMHG | BODY MASS INDEX: 28.35 KG/M2 | TEMPERATURE: 97.2 F | DIASTOLIC BLOOD PRESSURE: 82 MMHG | OXYGEN SATURATION: 96 %

## 2022-03-21 DIAGNOSIS — R42 DIZZINESS AND GIDDINESS: ICD-10-CM

## 2022-03-21 LAB
25(OH)D3 SERPL-MCNC: 40 NG/ML
ALBUMIN SERPL ELPH-MCNC: 4.7 G/DL
ALP BLD-CCNC: 60 U/L
ALT SERPL-CCNC: 43 U/L
ANION GAP SERPL CALC-SCNC: 17 MMOL/L
APPEARANCE: CLEAR
AST SERPL-CCNC: 34 U/L
BASOPHILS # BLD AUTO: 0.04 K/UL
BASOPHILS NFR BLD AUTO: 0.7 %
BILIRUB SERPL-MCNC: 1.4 MG/DL
BILIRUBIN URINE: NEGATIVE
BLOOD URINE: NEGATIVE
BUN SERPL-MCNC: 15 MG/DL
CALCIUM SERPL-MCNC: 9.8 MG/DL
CHLORIDE SERPL-SCNC: 101 MMOL/L
CHOLEST SERPL-MCNC: 174 MG/DL
CK SERPL-CCNC: 167 U/L
CO2 SERPL-SCNC: 22 MMOL/L
COLOR: NORMAL
CREAT SERPL-MCNC: 1.03 MG/DL
CREAT SPEC-SCNC: 61 MG/DL
EOSINOPHIL # BLD AUTO: 0.16 K/UL
EOSINOPHIL NFR BLD AUTO: 3 %
FOLATE SERPL-MCNC: >20 NG/ML
GLUCOSE QUALITATIVE U: NEGATIVE
GLUCOSE SERPL-MCNC: 92 MG/DL
HCT VFR BLD CALC: 49.3 %
HDLC SERPL-MCNC: 51 MG/DL
HGB BLD-MCNC: 16.7 G/DL
IMM GRANULOCYTES NFR BLD AUTO: 0.2 %
KETONES URINE: NEGATIVE
LDLC SERPL CALC-MCNC: 86 MG/DL
LEUKOCYTE ESTERASE URINE: NEGATIVE
LYMPHOCYTES # BLD AUTO: 0.99 K/UL
LYMPHOCYTES NFR BLD AUTO: 18.5 %
MAN DIFF?: NORMAL
MCHC RBC-ENTMCNC: 32.1 PG
MCHC RBC-ENTMCNC: 33.9 GM/DL
MCV RBC AUTO: 94.8 FL
MICROALBUMIN 24H UR DL<=1MG/L-MCNC: 1.4 MG/DL
MICROALBUMIN/CREAT 24H UR-RTO: 23 MG/G
MONOCYTES # BLD AUTO: 0.56 K/UL
MONOCYTES NFR BLD AUTO: 10.4 %
NEUTROPHILS # BLD AUTO: 3.6 K/UL
NEUTROPHILS NFR BLD AUTO: 67.2 %
NITRITE URINE: NEGATIVE
NONHDLC SERPL-MCNC: 122 MG/DL
PH URINE: 5
PLATELET # BLD AUTO: 198 K/UL
POTASSIUM SERPL-SCNC: 4.1 MMOL/L
PROT SERPL-MCNC: 8 G/DL
PROTEIN URINE: NEGATIVE
RBC # BLD: 5.2 M/UL
RBC # FLD: 12 %
SODIUM SERPL-SCNC: 140 MMOL/L
SPECIFIC GRAVITY URINE: 1.01
TRIGL SERPL-MCNC: 184 MG/DL
TSH SERPL-ACNC: 1.78 UIU/ML
UROBILINOGEN URINE: NORMAL
VIT B12 SERPL-MCNC: 480 PG/ML
WBC # FLD AUTO: 5.36 K/UL

## 2022-03-21 PROCEDURE — 99214 OFFICE O/P EST MOD 30 MIN: CPT | Mod: 25

## 2022-03-21 PROCEDURE — 93000 ELECTROCARDIOGRAM COMPLETE: CPT

## 2022-03-24 LAB
BASOPHILS # BLD AUTO: 0.05 K/UL
BASOPHILS NFR BLD AUTO: 1 %
EOSINOPHIL # BLD AUTO: 0.2 K/UL
EOSINOPHIL NFR BLD AUTO: 3.8 %
HCT VFR BLD CALC: 47.7 %
HGB BLD-MCNC: 16.1 G/DL
IMM GRANULOCYTES NFR BLD AUTO: 0.2 %
LYMPHOCYTES # BLD AUTO: 1.11 K/UL
LYMPHOCYTES NFR BLD AUTO: 21.3 %
MAN DIFF?: NORMAL
MCHC RBC-ENTMCNC: 31.9 PG
MCHC RBC-ENTMCNC: 33.8 GM/DL
MCV RBC AUTO: 94.6 FL
MONOCYTES # BLD AUTO: 0.48 K/UL
MONOCYTES NFR BLD AUTO: 9.2 %
NEUTROPHILS # BLD AUTO: 3.35 K/UL
NEUTROPHILS NFR BLD AUTO: 64.5 %
PLATELET # BLD AUTO: 215 K/UL
RBC # BLD: 5.04 M/UL
RBC # FLD: 11.7 %
WBC # FLD AUTO: 5.2 K/UL

## 2022-03-27 LAB
25(OH)D3 SERPL-MCNC: 28.1 NG/ML
ALBUMIN SERPL ELPH-MCNC: 4.9 G/DL
ALP BLD-CCNC: 62 U/L
ALT SERPL-CCNC: 31 U/L
ANION GAP SERPL CALC-SCNC: 14 MMOL/L
APPEARANCE: CLEAR
AST SERPL-CCNC: 25 U/L
BILIRUB SERPL-MCNC: 1 MG/DL
BILIRUBIN URINE: NEGATIVE
BLOOD URINE: NEGATIVE
BUN SERPL-MCNC: 13 MG/DL
CALCIUM SERPL-MCNC: 9.4 MG/DL
CHLORIDE SERPL-SCNC: 101 MMOL/L
CHOLEST SERPL-MCNC: 145 MG/DL
CK SERPL-CCNC: 170 U/L
CO2 SERPL-SCNC: 25 MMOL/L
COLOR: NORMAL
CREAT SERPL-MCNC: 0.99 MG/DL
EGFR: 92 ML/MIN/1.73M2
ESTIMATED AVERAGE GLUCOSE: 97 MG/DL
GLUCOSE QUALITATIVE U: NEGATIVE
GLUCOSE SERPL-MCNC: 93 MG/DL
HBA1C MFR BLD HPLC: 5 %
HDLC SERPL-MCNC: 54 MG/DL
KETONES URINE: NEGATIVE
LDLC SERPL CALC-MCNC: 71 MG/DL
LEUKOCYTE ESTERASE URINE: NEGATIVE
NITRITE URINE: NEGATIVE
NONHDLC SERPL-MCNC: 92 MG/DL
PH URINE: 5.5
POTASSIUM SERPL-SCNC: 4 MMOL/L
PROT SERPL-MCNC: 7.1 G/DL
PROTEIN URINE: NEGATIVE
PSA SERPL-MCNC: 0.74 NG/ML
SODIUM SERPL-SCNC: 140 MMOL/L
SPECIFIC GRAVITY URINE: 1.01
TRIGL SERPL-MCNC: 105 MG/DL
TSH SERPL-ACNC: 2.33 UIU/ML
UROBILINOGEN URINE: NORMAL

## 2022-03-27 NOTE — DISCUSSION/SUMMARY
[FreeTextEntry1] : IMPRESSION: Mr. Aiken is a 51 year old man with a history of coronary artery disease status post RESOLUTE JOHN to the mid RCA and mid LAD 1/2016, hypertension, hyperlipidemia, family history of coronary artery disease, bicuspid aortic valve with aortic stenosis, ascending aortic aneurysm, mediastinal hematoma (since resolved) who presents today for follow up of coronary artery disease, hyperlipidemia, and HTN. \par \par PLAN:\par 1. He had a 24 hour Holter monitor that did not reveal any significant ectopy. There was no ectopy on exam or on his ECG that was performed today.  He will continue on Toprol XL 25 mg twice daily for suppression of his ectopy. \par 2. His blood pressure is well controlled, thus he will continue on diet modification along with decreased alcohol intake. He will also continue on Amlodipine 5 mg twice daily and Toprol XL. \par 3. We discussed an ischemic evaluation, however, given that he feels well, we will hold off at this time. He had a cardiac catheterization performed 11/2016 that revealed nonobstructive CAD. He will continue on ASA 81 mg daily and Brilinta 60 mg twice daily.  I will be checking a CBC today. His echocardiogram performed 3 and a half months ago revealed normal LV systolic function, stable moderate aortic stenosis and a stable aortic aneurysm at 4.8 cm. \par 4. He will continue on Lipitor 20 mg daily given his hyperlipidemia. I will be checking a lipid profile today in addition to a CMP. His most recent triglycerides were mildly elevated with an LDL that was above his ideal goal.\par 5. He will follow up with me in 3-4 months or sooner should he experience any symptoms in the interim. He will also schedule an echocardiogram at that time to follow up his aortic stenosis and dilated aorta.  \par 6. He will schedule an MRA of the chest to follow up his dilated aorta.\par 7. He will schedule a carotid Doppler given his episodes of dizziness.

## 2022-03-27 NOTE — CARDIOLOGY SUMMARY
[LVEF ___%] : LVEF [unfilled]% [None] : no pulmonary hypertension [Mild] : mild mitral regurgitation [___] : [unfilled] [de-identified] : 3/21/2022: Sinus Rhythm at 65 bpm\par  [de-identified] : 12/1/2021: Calcified bicuspid aortic valve. There is raphe formation of the right coronary cusp and left coronary cusp of the aortic valve. Peak transaortic valve gradient equals 21 mm Hg, mean transaortic valve gradient equals 12 mm Hg, estimated aortic valve area equals 1.3 sqcm (by continuity equation), consistent with moderate aortic stenosis. Mild aortic regurgitation. Mild to moderate aortic root dilatation  (Ao: 4.5 cm at the sinuses of Valsalva). The proximal ascending aorta is moderately dilated, measuring approximately 4.8 cm. Concentric left ventricular remodeling. Normal left ventricular systolic function. No segmental wall motion abnormalities. EF is approximately 60%. Mild diastolic dysfunction. Mild right atrial enlargement. Mild right ventricular enlargement with normal right ventricular systolic function. PASP= 29 mm Hg. Mild tricuspid regurgitation.

## 2022-03-27 NOTE — HISTORY OF PRESENT ILLNESS
[FreeTextEntry1] : Patient is a 51 year old man with a history of coronary artery disease status post drug-eluting stents to the mid RCA and mid LAD 1/14/2016, hypertension, hyperlipidemia, family history of coronary artery disease, bicuspid aortic valve with aortic stenosis, ascending aortic aneurysm, and mediastinal hematoma (since resolved) who presents today for follow up of coronary artery disease, hyperlipidemia, and HTN.  He has been feeling well, he denies any chest pain, shortness of breath at rest, and headaches. He mentions rare palpitations as well as rare episodes of dyspnea with exertion. He has not yet followed up with his cardiothoracic surgeon for management of his aneurysm and aortic stenosis. He also mentions rare episodes of dizziness.

## 2022-03-28 ENCOUNTER — APPOINTMENT (OUTPATIENT)
Dept: CARDIOLOGY | Facility: CLINIC | Age: 52
End: 2022-03-28

## 2022-04-08 ENCOUNTER — OUTPATIENT (OUTPATIENT)
Dept: OUTPATIENT SERVICES | Facility: HOSPITAL | Age: 52
LOS: 1 days | End: 2022-04-08
Payer: COMMERCIAL

## 2022-04-08 ENCOUNTER — APPOINTMENT (OUTPATIENT)
Dept: MRI IMAGING | Facility: CLINIC | Age: 52
End: 2022-04-08
Payer: COMMERCIAL

## 2022-04-08 DIAGNOSIS — Z98.890 OTHER SPECIFIED POSTPROCEDURAL STATES: Chronic | ICD-10-CM

## 2022-04-08 DIAGNOSIS — Z00.8 ENCOUNTER FOR OTHER GENERAL EXAMINATION: ICD-10-CM

## 2022-04-08 DIAGNOSIS — N44.00 TORSION OF TESTIS, UNSPECIFIED: Chronic | ICD-10-CM

## 2022-04-08 PROCEDURE — C8911: CPT

## 2022-04-08 PROCEDURE — 71555 MRI ANGIO CHEST W OR W/O DYE: CPT | Mod: 26

## 2022-04-08 PROCEDURE — A9585: CPT

## 2022-09-21 ENCOUNTER — APPOINTMENT (OUTPATIENT)
Dept: CARDIOLOGY | Facility: CLINIC | Age: 52
End: 2022-09-21
Payer: COMMERCIAL

## 2022-09-21 ENCOUNTER — NON-APPOINTMENT (OUTPATIENT)
Age: 52
End: 2022-09-21

## 2022-09-21 VITALS
BODY MASS INDEX: 28.41 KG/M2 | DIASTOLIC BLOOD PRESSURE: 79 MMHG | HEIGHT: 67 IN | HEART RATE: 71 BPM | WEIGHT: 181 LBS | OXYGEN SATURATION: 97 % | RESPIRATION RATE: 12 BRPM | SYSTOLIC BLOOD PRESSURE: 145 MMHG

## 2022-09-21 PROCEDURE — 93306 TTE W/DOPPLER COMPLETE: CPT | Mod: 1L

## 2022-09-21 PROCEDURE — 99215 OFFICE O/P EST HI 40 MIN: CPT | Mod: 25

## 2022-09-21 PROCEDURE — 93000 ELECTROCARDIOGRAM COMPLETE: CPT

## 2022-11-04 NOTE — DISCUSSION/SUMMARY
[FreeTextEntry1] : IMPRESSION: Mr. Aiken is a 52 year old man with a history of coronary artery disease status post RESOLUTE JOHN to the mid RCA and mid LAD 1/2016, hypertension, hyperlipidemia, family history of coronary artery disease, bicuspid aortic valve with aortic stenosis, ascending aortic aneurysm, mediastinal hematoma (since resolved) who presents today for follow up of coronary artery disease, hyperlipidemia, and HTN. \par \par PLAN:\par 1. He had a 24 hour Holter monitor that did not reveal any significant ectopy. There was no ectopy on exam or on his ECG that was performed today.  He will continue on Toprol XL 25 mg twice daily for suppression of his ectopy. \par 2. His blood pressure is adequate, thus he will continue on diet modification along with decreased alcohol intake. He will also continue on Amlodipine 5 mg twice daily and Toprol XL. \par 3. We discussed an ischemic evaluation given his chest discomfort, and have decided on a left heart cath given his aortic aneurysm and aortic stenosis. He had a cardiac catheterization performed 11/2016 that revealed nonobstructive CAD. He will continue on ASA 81 mg daily and Brilinta 60 mg twice daily.  I will be checking a CBC today. His MRA showed a moderately dilated mid ascending aorta. \par 4. He will continue on Lipitor 20 mg daily given his hyperlipidemia. I will be checking a lipid profile today in addition to a CMP. \par 5. He will follow up with me in 4 months or after his left heart cath.  [EKG obtained to assist in diagnosis and management of assessed problem(s)] : EKG obtained to assist in diagnosis and management of assessed problem(s)

## 2022-11-04 NOTE — HISTORY OF PRESENT ILLNESS
[FreeTextEntry1] : Patient is a 52 year old man with a history of coronary artery disease status post drug-eluting stents to the mid RCA and mid LAD 1/14/2016, hypertension, hyperlipidemia, family history of coronary artery disease, bicuspid aortic valve with aortic stenosis, ascending aortic aneurysm, and mediastinal hematoma (since resolved) who presents today for follow up of coronary artery disease, hyperlipidemia, and HTN.  He mentions experiencing episodes of chest discomfort that can occur at rest or when he is at work that do not affect his activities of daily living. He otherwise denies any shortness of breath at rest, dizziness, and headaches. He mentions rare palpitations as well as rare episodes of dyspnea with exertion.

## 2022-11-04 NOTE — CARDIOLOGY SUMMARY
[LVEF ___%] : LVEF [unfilled]% [None] : no pulmonary hypertension [Mild] : mild mitral regurgitation [___] : [unfilled] [de-identified] : 9/21/2022: Sinus Rhythm at 66 bpm\par  [de-identified] : 12/1/2021: Calcified bicuspid aortic valve. There is raphe formation of the right coronary cusp and left coronary cusp of the aortic valve. Peak transaortic valve gradient equals 21 mm Hg, mean transaortic valve gradient equals 12 mm Hg, estimated aortic valve area equals 1.3 sqcm (by continuity equation), consistent with moderate aortic stenosis. Mild aortic regurgitation. Mild to moderate aortic root dilatation  (Ao: 4.5 cm at the sinuses of Valsalva). The proximal ascending aorta is moderately dilated, measuring approximately 4.8 cm. Concentric left ventricular remodeling. Normal left ventricular systolic function. No segmental wall motion abnormalities. EF is approximately 60%. Mild diastolic dysfunction. Mild right atrial enlargement. Mild right ventricular enlargement with normal right ventricular systolic function. PASP= 29 mm Hg. Mild tricuspid regurgitation.

## 2023-02-14 LAB
APPEARANCE: CLEAR
BILIRUBIN URINE: NEGATIVE
BLOOD URINE: NEGATIVE
COLOR: NORMAL
GLUCOSE QUALITATIVE U: NEGATIVE
KETONES URINE: NEGATIVE
LEUKOCYTE ESTERASE URINE: NEGATIVE
NITRITE URINE: NEGATIVE
PH URINE: 6
PROTEIN URINE: NEGATIVE
SPECIFIC GRAVITY URINE: 1.01
UROBILINOGEN URINE: NORMAL

## 2023-03-21 ENCOUNTER — APPOINTMENT (OUTPATIENT)
Dept: CARDIOLOGY | Facility: CLINIC | Age: 53
End: 2023-03-21
Payer: COMMERCIAL

## 2023-03-21 ENCOUNTER — NON-APPOINTMENT (OUTPATIENT)
Age: 53
End: 2023-03-21

## 2023-03-21 VITALS
HEIGHT: 67 IN | TEMPERATURE: 97.9 F | OXYGEN SATURATION: 98 % | HEART RATE: 68 BPM | WEIGHT: 183 LBS | BODY MASS INDEX: 28.72 KG/M2 | SYSTOLIC BLOOD PRESSURE: 125 MMHG | RESPIRATION RATE: 12 BRPM | DIASTOLIC BLOOD PRESSURE: 80 MMHG

## 2023-03-21 VITALS — SYSTOLIC BLOOD PRESSURE: 126 MMHG | DIASTOLIC BLOOD PRESSURE: 82 MMHG

## 2023-03-21 LAB
25(OH)D3 SERPL-MCNC: 52.7 NG/ML
ALBUMIN SERPL ELPH-MCNC: 5 G/DL
ALP BLD-CCNC: 61 U/L
ALT SERPL-CCNC: 43 U/L
ANION GAP SERPL CALC-SCNC: 13 MMOL/L
AST SERPL-CCNC: 29 U/L
BASOPHILS # BLD AUTO: 0.06 K/UL
BASOPHILS NFR BLD AUTO: 1 %
BILIRUB SERPL-MCNC: 1.6 MG/DL
BUN SERPL-MCNC: 16 MG/DL
CALCIUM SERPL-MCNC: 9.6 MG/DL
CHLORIDE SERPL-SCNC: 103 MMOL/L
CHOLEST SERPL-MCNC: 149 MG/DL
CK SERPL-CCNC: 156 U/L
CO2 SERPL-SCNC: 23 MMOL/L
CREAT SERPL-MCNC: 1.05 MG/DL
CREAT SPEC-SCNC: 101 MG/DL
EGFR: 85 ML/MIN/1.73M2
EOSINOPHIL # BLD AUTO: 0.13 K/UL
EOSINOPHIL NFR BLD AUTO: 2.2 %
ESTIMATED AVERAGE GLUCOSE: 103 MG/DL
FOLATE SERPL-MCNC: 16.6 NG/ML
GLUCOSE SERPL-MCNC: 98 MG/DL
HBA1C MFR BLD HPLC: 5.2 %
HCT VFR BLD CALC: 47.3 %
HDLC SERPL-MCNC: 55 MG/DL
HGB BLD-MCNC: 16 G/DL
IMM GRANULOCYTES NFR BLD AUTO: 0.2 %
LDLC SERPL CALC-MCNC: 69 MG/DL
LYMPHOCYTES # BLD AUTO: 0.99 K/UL
LYMPHOCYTES NFR BLD AUTO: 16.8 %
MAN DIFF?: NORMAL
MCHC RBC-ENTMCNC: 31.7 PG
MCHC RBC-ENTMCNC: 33.8 GM/DL
MCV RBC AUTO: 93.7 FL
MICROALBUMIN 24H UR DL<=1MG/L-MCNC: 1.4 MG/DL
MICROALBUMIN/CREAT 24H UR-RTO: 14 MG/G
MONOCYTES # BLD AUTO: 0.51 K/UL
MONOCYTES NFR BLD AUTO: 8.6 %
NEUTROPHILS # BLD AUTO: 4.2 K/UL
NEUTROPHILS NFR BLD AUTO: 71.2 %
NONHDLC SERPL-MCNC: 94 MG/DL
PLATELET # BLD AUTO: 211 K/UL
POTASSIUM SERPL-SCNC: 4.6 MMOL/L
PROT SERPL-MCNC: 7 G/DL
RBC # BLD: 5.05 M/UL
RBC # FLD: 12.2 %
SODIUM SERPL-SCNC: 139 MMOL/L
TRIGL SERPL-MCNC: 126 MG/DL
TSH SERPL-ACNC: 1.91 UIU/ML
VIT B12 SERPL-MCNC: 486 PG/ML
WBC # FLD AUTO: 5.9 K/UL

## 2023-03-21 PROCEDURE — 93000 ELECTROCARDIOGRAM COMPLETE: CPT

## 2023-03-21 PROCEDURE — 99214 OFFICE O/P EST MOD 30 MIN: CPT | Mod: 25

## 2023-03-21 PROCEDURE — 93306 TTE W/DOPPLER COMPLETE: CPT

## 2023-03-22 LAB
25(OH)D3 SERPL-MCNC: 34.6 NG/ML
ALBUMIN SERPL ELPH-MCNC: 4.4 G/DL
ALP BLD-CCNC: 61 U/L
ALT SERPL-CCNC: 41 U/L
ANION GAP SERPL CALC-SCNC: 16 MMOL/L
AST SERPL-CCNC: 30 U/L
BASOPHILS # BLD AUTO: 0.06 K/UL
BASOPHILS NFR BLD AUTO: 1.1 %
BILIRUB SERPL-MCNC: 0.8 MG/DL
BUN SERPL-MCNC: 16 MG/DL
CALCIUM SERPL-MCNC: 9.8 MG/DL
CHLORIDE SERPL-SCNC: 104 MMOL/L
CHOLEST SERPL-MCNC: 129 MG/DL
CK SERPL-CCNC: 161 U/L
CO2 SERPL-SCNC: 22 MMOL/L
CREAT SERPL-MCNC: 0.95 MG/DL
EGFR: 96 ML/MIN/1.73M2
EOSINOPHIL # BLD AUTO: 0.2 K/UL
EOSINOPHIL NFR BLD AUTO: 3.7 %
ESTIMATED AVERAGE GLUCOSE: 100 MG/DL
GLUCOSE SERPL-MCNC: 70 MG/DL
HBA1C MFR BLD HPLC: 5.1 %
HCT VFR BLD CALC: 48.9 %
HDLC SERPL-MCNC: 42 MG/DL
HGB BLD-MCNC: 16 G/DL
IMM GRANULOCYTES NFR BLD AUTO: 0.4 %
LDLC SERPL CALC-MCNC: 66 MG/DL
LYMPHOCYTES # BLD AUTO: 1.17 K/UL
LYMPHOCYTES NFR BLD AUTO: 21.8 %
MAN DIFF?: NORMAL
MCHC RBC-ENTMCNC: 31.7 PG
MCHC RBC-ENTMCNC: 32.7 GM/DL
MCV RBC AUTO: 97 FL
MONOCYTES # BLD AUTO: 0.53 K/UL
MONOCYTES NFR BLD AUTO: 9.9 %
NEUTROPHILS # BLD AUTO: 3.38 K/UL
NEUTROPHILS NFR BLD AUTO: 63.1 %
NONHDLC SERPL-MCNC: 87 MG/DL
PLATELET # BLD AUTO: 227 K/UL
POTASSIUM SERPL-SCNC: 4.1 MMOL/L
PROT SERPL-MCNC: 7.5 G/DL
PSA SERPL-MCNC: 0.65 NG/ML
RBC # BLD: 5.04 M/UL
RBC # FLD: 12.3 %
SODIUM SERPL-SCNC: 142 MMOL/L
TRIGL SERPL-MCNC: 105 MG/DL
TSH SERPL-ACNC: 2.42 UIU/ML
WBC # FLD AUTO: 5.36 K/UL

## 2023-04-29 ENCOUNTER — APPOINTMENT (OUTPATIENT)
Dept: MRI IMAGING | Facility: CLINIC | Age: 53
End: 2023-04-29
Payer: COMMERCIAL

## 2023-04-29 ENCOUNTER — OUTPATIENT (OUTPATIENT)
Dept: OUTPATIENT SERVICES | Facility: HOSPITAL | Age: 53
LOS: 1 days | End: 2023-04-29
Payer: COMMERCIAL

## 2023-04-29 DIAGNOSIS — I71.21 ANEURYSM OF THE ASCENDING AORTA, WITHOUT RUPTURE: ICD-10-CM

## 2023-04-29 DIAGNOSIS — Z98.890 OTHER SPECIFIED POSTPROCEDURAL STATES: Chronic | ICD-10-CM

## 2023-04-29 DIAGNOSIS — N44.00 TORSION OF TESTIS, UNSPECIFIED: Chronic | ICD-10-CM

## 2023-04-29 PROCEDURE — 71555 MRI ANGIO CHEST W OR W/O DYE: CPT | Mod: 26

## 2023-04-29 PROCEDURE — A9585: CPT

## 2023-04-29 PROCEDURE — C8911: CPT

## 2023-05-10 ENCOUNTER — APPOINTMENT (OUTPATIENT)
Dept: CARDIOTHORACIC SURGERY | Facility: CLINIC | Age: 53
End: 2023-05-10
Payer: COMMERCIAL

## 2023-05-10 VITALS
DIASTOLIC BLOOD PRESSURE: 84 MMHG | RESPIRATION RATE: 14 BRPM | WEIGHT: 180 LBS | SYSTOLIC BLOOD PRESSURE: 141 MMHG | OXYGEN SATURATION: 97 % | HEART RATE: 68 BPM | BODY MASS INDEX: 28.25 KG/M2 | HEIGHT: 67 IN | TEMPERATURE: 97.1 F

## 2023-05-10 PROCEDURE — 99214 OFFICE O/P EST MOD 30 MIN: CPT

## 2023-05-12 NOTE — HISTORY OF PRESENT ILLNESS
[FreeTextEntry1] : 52  year old male with past medical history to include coronary artery disease status post drug-eluting stents to the mid RCA and mid LAD 1/14/2016, hypertension, hyperlipidemia, family history of coronary artery disease, bicuspid aortic valve with aortic stenosis, ascending aortic aneurysm, and mediastinal hematoma (since resolved), was recommended to follow up in 1 year but rescheduled, now  presents for a 3 year  follow up visit for evaluation and management of an aortic root and ascending aortic aneurysm. \par \par Patient is doing well and denies recent hospitalization, ER visits, or surgeries. He denies fever, chills, fatigue, headache, blurred vision, dizziness, syncope, chest pain, palpitations, shortness of breath, orthopnea, paroxysmal nocturnal dyspnea, nausea, vomiting, abdominal pain, back pain, headache, visual disturbances, CVA, PE, DVT, D/C, hematochezia, melena, dysuria, hematuria,  BRBPR or swelling to legs.\par \par \par 4/29/23 MRA chest revealed Stable thoracic aorta measuring up to 4.6 cm in the mid ascending segment and up to 4.4 cm at the sinuses of Valsalva in the setting of a bicuspid aortic valve.\par - No aortic dissection.\par \par 3/21/23 TTE revealed Normal left ventricular systolic function. EF is approximately 60%. Concentric left ventricular remodeling.\par 2. Mild (grade 1) left ventricular diastolic dysfunction.\par 3. Mildly enlarged right ventricular cavity size and normal systolic function.\par 4. The right atrium is mildly dilated.\par 5. Calcified bicuspid aortic with decreased opening. The aortic valve area is approximately 1.1 sqcm, by the continuity equation, which is consistent with moderate aortic stenosis.\par 6. Mid tricuspid regurgitation. No echocardiographic evidence of pulmonary hypertension. PASP= 21 mmHg.\par 7. Thickened mitral valve leaflets. Mild mitral regurgitation.\par 8. Trace pericardial effusion.\par 9. The aortic root at sinuses of Valsalva is moderately dilated, measuring approximately 4.75 cm. The proximal ascending aorta is moderately dilated, measuring approximately 4.8 cm.\par \par \par

## 2023-05-12 NOTE — END OF VISIT
[FreeTextEntry3] : \par \par Scribe Attestation:\par I personally scribed for SERENANANCY CHULA on May 10 2023  8:00AM . \par \par \par \par \par Physician Attestation:\par Documented by FIDENCIO BARCENAS acting as a scribe for ZAMZAMCHULA CHAVARRIA 05/10/2023 . \par                 All medical record entries made by the Scribe were at , CHULA ELIZABETH , direction and personally dictated by me on 05/10/2023 . I have reviewed the chart and agree that the record accurately reflects my personal performance of the history, physical exam, assessment and plan\par \par I personally performed the services described in the documentation, reviewed the documentation recorded by the scribe in my presence and it accurately and completely records my words and actions.\par \par

## 2023-05-12 NOTE — DATA REVIEWED
[FreeTextEntry1] : 4/29/23 MRA chest revealed Stable thoracic aorta measuring up to 4.6 cm in the mid ascending segment and up to 4.4 cm at the sinuses of Valsalva in the setting of a bicuspid aortic valve.\par - No aortic dissection.\par \par 11/9/20 MRA Chest revealed mid ascending aorta measures up to 4.6 cm, previously 4.4 cm. \par \par 6/8/20 TTE revealed EF 60%, minimal mitral regurgitation, calcified aortic valve probably bicuspid with decreased opening.Peak trans aortic gradient 19 mm hg, mean trans aortic gradient 13  mm hg, estimated MARY 1.3 sq cm consistent with moderate aortic stenosis. Moderate aortic root dilatation 4.6 cm at sinuses of Valsalva. proximal ascending aorta 4.8 cm . \par \par MRA of the Chest revealed 12/5/2018:\par -Sinuses of Valsalva: 43 mm\par -Ascending thoracic aorta: 44 mm, stable from 12/12/2016\par -Descending thoracic aorta: 22 mm\par \par \par CT chest 12/12/16 revealed: ascending aorta 4.3cm. MRI chest 12/28/16 revealed: aortic root measuring \par 4.3 cm, the ascending aorta 4.7 cm, the aortic arch 2.6 cm, and the descending aorta 2.5 cm.\par

## 2023-05-12 NOTE — PHYSICAL EXAM
[Sclera] : the sclera and conjunctiva were normal [PERRL With Normal Accommodation] : pupils were equal in size, round, and reactive to light [Neck Appearance] : the appearance of the neck was normal [] : no respiratory distress [Respiration, Rhythm And Depth] : normal respiratory rhythm and effort [Auscultation Breath Sounds / Voice Sounds] : lungs were clear to auscultation bilaterally [Apical Impulse] : the apical impulse was normal [Heart Rate And Rhythm] : heart rate was normal and rhythm regular [Heart Sounds] : normal S1 and S2 [Murmurs] : no murmurs [Examination Of The Chest] : the chest was normal in appearance [2+] : left 2+ [Breast Appearance] : normal in appearance [Bowel Sounds] : normal bowel sounds [Abdomen Soft] : soft [No CVA Tenderness] : no ~M costovertebral angle tenderness [Abnormal Walk] : normal gait [Involuntary Movements] : no involuntary movements were seen [Skin Color & Pigmentation] : normal skin color and pigmentation [Skin Turgor] : normal skin turgor [No Focal Deficits] : no focal deficits [Oriented To Time, Place, And Person] : oriented to person, place, and time [Impaired Insight] : insight and judgment were intact [Affect] : the affect was normal [Mood] : the mood was normal [Memory Recent] : recent memory was not impaired [Memory Remote] : remote memory was not impaired [FreeTextEntry1] : Deferred

## 2023-05-12 NOTE — CONSULT LETTER
[Dear  ___] : Dear  [unfilled], [Courtesy Letter:] : I had the pleasure of seeing your patient, [unfilled], in my office today. [FreeTextEntry2] : Randy Thomason MD \par [phone] [place] [address], [address]\[place] [id number] [FreeTextEntry1] : We take a multidisciplinary team approach to patient care and consider you, the referring physician, an extension of our team. We will maintain an open line of communication with you throughout your patient's treatment course. \par \par Mr. Aiken is a 48 year old male with past medical history to include hypertension, hyperlipidemia, recent diagnosis of mediastinal mass (being followed by Dr. Souza), presents for a follow up visit for evaluation and management of an aortic root and ascending aortic aneurysm. \par \par 4/11/23 MRA chest revealed Stable thoracic aorta measuring up to 4.6 cm in the mid ascending segment and up to 4.4 cm at the sinuses of Valsalva in the setting of a bicuspid aortic valve.\par - No aortic dissection.\par \par \par I have reviewed the patient's medical records, diagnostic images during the time of this office consultation and have made the following recommendation.\par Plan:\par 1. [facility] 6 months with TTE \par 2. Continue medication regimen.\par 3. Follow up with cardiologist and PCP.\par 4. Blood pressure management.\par \par My office will assist the patient with the patient’s upcoming appointment and I will update you on the patient’s progress at that time.\par \par I have discussed with the patient that we will continue to monitor the patient’s aortic pathology closely at the [facility] for the [facility], that encompasses the entire health care system and is one of the largest in the nation at this point.\par \par \par I appreciate the opportunity to care for your patient at the [facility] for [facility] based at [place].  If there are any questions or concerns, please call me directly at [phone]. \par \par Sincerely, \par \par \par \par Michael Johansen M.D.\par Professor of Cardiovascular and Thoracic Surgery\par Minimally Invasive Valve Surgeon\par Director of Aortic Surgery, [facility]\par [address], [place] [address]\par Cell: [phone]\par Email: [email or web address] \par \par Practice Manager: Ms. Liana Hernandez\par Email: [email or web address]\par Phone: [phone]\par \par For [facility]:\par Department of Cardiovascular and Thoracic Surgery\par [address], [place] [address]\par Office: [phone]\par Fax: [phone]\par \par For [facility]:\par Department of Cardiovascular and Thoracic Surgery\par [address] [place], [place], [address]\par Fax: [phone]\par Office: [phone]\par \par \par \par \par \par \par

## 2023-05-12 NOTE — ASSESSMENT
[FreeTextEntry1] : 52  year old male with past medical history to include coronary artery disease status post drug-eluting stents to the mid RCA and mid LAD 1/14/2016, hypertension, hyperlipidemia, family history of coronary artery disease, bicuspid aortic valve with aortic stenosis, ascending aortic aneurysm, and mediastinal hematoma (since resolved), was recommended to follow up in 1 year but rescheduled, now  presents for a 3 year  follow up visit for evaluation and management of an aortic root and ascending aortic aneurysm. \par \par 4/29/23 MRA chest revealed Stable thoracic aorta measuring up to 4.6 cm in the mid ascending segment and up to 4.4 cm at the sinuses of Valsalva in the setting of a bicuspid aortic valve.\par - No aortic dissection.\par \par 3/21/23 TTE revealed Normal left ventricular systolic function. EF is approximately 60%. Concentric left ventricular remodeling.\par 2. Mild (grade 1) left ventricular diastolic dysfunction.\par 3. Mildly enlarged right ventricular cavity size and normal systolic function.\par 4. The right atrium is mildly dilated.\par 5. Calcified bicuspid aortic with decreased opening. The aortic valve area is approximately 1.1 sqcm, by the continuity equation, which is consistent with moderate aortic stenosis.\par 6. Mid tricuspid regurgitation. No echocardiographic evidence of pulmonary hypertension. PASP= 21 mmHg.\par 7. Thickened mitral valve leaflets. Mild mitral regurgitation.\par 8. Trace pericardial effusion.\par 9. The aortic root at sinuses of Valsalva is moderately dilated, measuring approximately 4.75 cm. The proximal ascending aorta is moderately dilated, measuring approximately 4.8 cm.\par \par \par \par I have reviewed the patient's medical records, diagnostic images during the time of this office consultation and have made the following recommendation. Review of the imaging shows his  aortic pathology has remained stable and does not require surgical intervention. He will be entered into the aortic registry surveillance program.\par \par Plan\par \par 1. Follow up in [facility] in 6 months with TTE \par 2. Continue medication regimen.\par 3. Follow up with cardiologist and PCP.\par 4. Blood pressure management.\par \par \par \par \par \par \par

## 2023-07-10 ENCOUNTER — NON-APPOINTMENT (OUTPATIENT)
Age: 53
End: 2023-07-10

## 2023-07-10 NOTE — CARDIOLOGY SUMMARY
[LVEF ___%] : LVEF [unfilled]% [None] : no pulmonary hypertension [Mild] : mild mitral regurgitation [___] : [unfilled] [de-identified] : 3/21/2023: Sinus Rhythm at 63 bpm\par  [de-identified] : 3/21/2023: Normal left ventricular systolic function. EF is approximately 60%. Concentric left ventricular remodeling. Mild left ventricular diastolic dysfunction. Mildly enlarged right ventricular cavity size and normal systolic function. The right atrium is mildly dilated. Calcified bicuspid aortic with decreased opening. The aortic valve area is approximately 1.1 sqcm, by the continuity equation, which is consistent with moderate aortic stenosis. Mid tricuspid regurgitation. No pulmonary hypertension. PASP= 21 mmHg. Thickened mitral valve leaflets. Mild mitral regurgitation. The aortic root at sinuses of Valsalva is moderately dilated, measuring approximately 4.75 cm. The proximal ascending aorta is moderately dilated, measuring approximately 4.8 cm.\par \par \par \par 12/1/2021: Calcified bicuspid aortic valve. There is raphe formation of the right coronary cusp and left coronary cusp of the aortic valve. Peak transaortic valve gradient equals 21 mm Hg, mean transaortic valve gradient equals 12 mm Hg, estimated aortic valve area equals 1.3 sqcm (by continuity equation), consistent with moderate aortic stenosis. Mild aortic regurgitation. Mild to moderate aortic root dilatation  (Ao: 4.5 cm at the sinuses of Valsalva). The proximal ascending aorta is moderately dilated, measuring approximately 4.8 cm. Concentric left ventricular remodeling. Normal left ventricular systolic function. No segmental wall motion abnormalities. EF is approximately 60%. Mild diastolic dysfunction. Mild right atrial enlargement. Mild right ventricular enlargement with normal right ventricular systolic function. PASP= 29 mm Hg. Mild tricuspid regurgitation. [de-identified] : Cardiac catheterization performed on 3/9/2023: 30% proximal LAD stenosis. 70% mid LAD in-stent stenosis. 40% stenosis of the second Diagonal. 20% in-stent restenosis of the mid RCA. 30% stenosis of the distal RCA. \par \par 3/9/2023: 3 x 8 mm XIENCE [place] JONH of the mid LAD.

## 2023-07-10 NOTE — DISCUSSION/SUMMARY
[FreeTextEntry1] : IMPRESSION: Mr. Aiken is a 52 year old man with a history of coronary artery disease status post RESOLUTE JOHN to the mid RCA and mid LAD 1/2016, now status post JOHN to the mid LAD on 3/9/2023, hypertension, hyperlipidemia, family history of coronary artery disease, bicuspid aortic valve with aortic stenosis, ascending aortic aneurysm, mediastinal hematoma (since resolved) who presents today for follow up of coronary artery disease, hyperlipidemia, and HTN. \par \par PLAN:\par 1. He had a 24 hour Holter monitor that did not reveal any significant ectopy. There was no ectopy on exam or on his ECG that was performed today.  He will continue on Toprol XL 25 mg twice daily for suppression of his ectopy. \par 2. His blood pressure is good today, thus he will continue on diet modification along with decreased alcohol intake. He will also continue on Amlodipine 5 mg twice daily and Toprol XL. \par 3. He had a JOHN placed to the mid LAD about 2 weeks ago. He will continue on ASA 81 mg daily and Brilinta 90 mg twice daily.  I will be checking a CBC today. \par 4. His MRA showed a moderately dilated mid ascending aorta for which I have asked him to follow up with Dr. Johansen.  \par 4. He will continue on Lipitor 20 mg daily given his hyperlipidemia. I will be checking a lipid profile today in addition to a CMP. \par 5. He will follow up with me in 4-6 months or sooner should he experience any symptoms in the interim.   [EKG obtained to assist in diagnosis and management of assessed problem(s)] : EKG obtained to assist in diagnosis and management of assessed problem(s)

## 2023-07-10 NOTE — HISTORY OF PRESENT ILLNESS
[FreeTextEntry1] : Patient is a 52 year old man with a history of coronary artery disease status post drug-eluting stents to the mid RCA and mid LAD 1/14/2016, now status post JOHN to the mid LAD on 3/9/2023, hypertension, hyperlipidemia, family history of coronary artery disease, bicuspid aortic valve with aortic stenosis, ascending aortic aneurysm, and mediastinal hematoma (since resolved) who presents today for follow up of coronary artery disease, hyperlipidemia, and HTN.  He mentions experiencing episodes of chest discomfort that can occur at rest and are relatively unchanged following stent placement. He otherwise denies any shortness of breath at rest, exertional chest pain, dizziness, palpitations, and headaches. He mentions episodes of dyspnea with exertion.

## 2023-07-10 NOTE — PHYSICAL EXAM
[General Appearance - Well Developed] : well developed [Normal Appearance] : normal appearance [Well Groomed] : well groomed [General Appearance - Well Nourished] : well nourished [No Deformities] : no deformities [General Appearance - In No Acute Distress] : no acute distress [Normal Conjunctiva] : the conjunctiva exhibited no abnormalities [Normal Jugular Venous A Waves Present] : normal jugular venous A waves present [Normal Jugular Venous V Waves Present] : normal jugular venous V waves present [Respiration, Rhythm And Depth] : normal respiratory rhythm and effort [Auscultation Breath Sounds / Voice Sounds] : lungs were clear to auscultation bilaterally [Exaggerated Use Of Accessory Muscles For Inspiration] : no accessory muscle use [Normal Rate] : normal [Rhythm Regular] : regular [Normal S1] : normal S1 [Normal S2] : normal S2 [I] : a grade 1 [II] : a grade 2 [No Pitting Edema] : no pitting edema present [Bowel Sounds] : normal bowel sounds [Abdomen Tenderness] : non-tender [Abdomen Soft] : soft [Abnormal Walk] : normal gait [Gait - Sufficient For Exercise Testing] : the gait was sufficient for exercise testing [Nail Clubbing] : no clubbing of the fingernails [Cyanosis, Localized] : no localized cyanosis [Petechial Hemorrhages (___cm)] : no petechial hemorrhages [Skin Color & Pigmentation] : normal skin color and pigmentation [] : no rash [No Skin Ulcers] : no skin ulcer [Oriented To Time, Place, And Person] : oriented to person, place, and time [Mood] : the mood was normal [Affect] : the affect was normal [No Anxiety] : not feeling anxious [FreeTextEntry1] : Extraocular muscles intact. Anicteric sclerae. [Normal Oral Mucosa] : normal oral mucosa [No Oral Pallor] : no oral pallor [No Oral Cyanosis] : no oral cyanosis [S3] : no S3 [Right Carotid Bruit] : no bruit heard over the right carotid [Left Carotid Bruit] : no bruit heard over the left carotid [Bruit] : no bruit heard

## 2023-09-25 ENCOUNTER — APPOINTMENT (OUTPATIENT)
Dept: CARDIOLOGY | Facility: CLINIC | Age: 53
End: 2023-09-25
Payer: COMMERCIAL

## 2023-09-25 ENCOUNTER — NON-APPOINTMENT (OUTPATIENT)
Age: 53
End: 2023-09-25

## 2023-09-25 VITALS
BODY MASS INDEX: 28.41 KG/M2 | OXYGEN SATURATION: 96 % | RESPIRATION RATE: 12 BRPM | WEIGHT: 181 LBS | HEART RATE: 72 BPM | SYSTOLIC BLOOD PRESSURE: 114 MMHG | HEIGHT: 67 IN | DIASTOLIC BLOOD PRESSURE: 79 MMHG

## 2023-09-25 VITALS — DIASTOLIC BLOOD PRESSURE: 76 MMHG | SYSTOLIC BLOOD PRESSURE: 124 MMHG

## 2023-09-25 PROCEDURE — 93000 ELECTROCARDIOGRAM COMPLETE: CPT

## 2023-09-25 PROCEDURE — 99215 OFFICE O/P EST HI 40 MIN: CPT | Mod: 25

## 2023-09-26 ENCOUNTER — APPOINTMENT (OUTPATIENT)
Dept: CARDIOLOGY | Facility: CLINIC | Age: 53
End: 2023-09-26
Payer: COMMERCIAL

## 2023-09-26 PROCEDURE — 93306 TTE W/DOPPLER COMPLETE: CPT

## 2023-10-09 ENCOUNTER — OUTPATIENT (OUTPATIENT)
Dept: OUTPATIENT SERVICES | Facility: HOSPITAL | Age: 53
LOS: 1 days | End: 2023-10-09
Payer: COMMERCIAL

## 2023-10-09 ENCOUNTER — APPOINTMENT (OUTPATIENT)
Dept: CV DIAGNOSITCS | Facility: HOSPITAL | Age: 53
End: 2023-10-09

## 2023-10-09 VITALS
HEART RATE: 61 BPM | RESPIRATION RATE: 16 BRPM | OXYGEN SATURATION: 95 % | DIASTOLIC BLOOD PRESSURE: 78 MMHG | SYSTOLIC BLOOD PRESSURE: 118 MMHG

## 2023-10-09 VITALS
WEIGHT: 179.9 LBS | TEMPERATURE: 99 F | HEIGHT: 67 IN | RESPIRATION RATE: 14 BRPM | DIASTOLIC BLOOD PRESSURE: 85 MMHG | HEART RATE: 67 BPM | OXYGEN SATURATION: 99 % | SYSTOLIC BLOOD PRESSURE: 137 MMHG

## 2023-10-09 DIAGNOSIS — Z98.890 OTHER SPECIFIED POSTPROCEDURAL STATES: Chronic | ICD-10-CM

## 2023-10-09 DIAGNOSIS — N44.00 TORSION OF TESTIS, UNSPECIFIED: Chronic | ICD-10-CM

## 2023-10-09 DIAGNOSIS — Q23.1 CONGENITAL INSUFFICIENCY OF AORTIC VALVE: ICD-10-CM

## 2023-10-09 PROCEDURE — 93320 DOPPLER ECHO COMPLETE: CPT

## 2023-10-09 PROCEDURE — 76377 3D RENDER W/INTRP POSTPROCES: CPT

## 2023-10-09 PROCEDURE — 93312 ECHO TRANSESOPHAGEAL: CPT

## 2023-10-09 PROCEDURE — 93320 DOPPLER ECHO COMPLETE: CPT | Mod: 26

## 2023-10-09 PROCEDURE — 93325 DOPPLER ECHO COLOR FLOW MAPG: CPT | Mod: 26

## 2023-10-09 PROCEDURE — 76377 3D RENDER W/INTRP POSTPROCES: CPT | Mod: 26

## 2023-10-09 PROCEDURE — 93312 ECHO TRANSESOPHAGEAL: CPT | Mod: 26

## 2023-10-09 PROCEDURE — 93325 DOPPLER ECHO COLOR FLOW MAPG: CPT

## 2023-11-07 PROBLEM — I35.0 MODERATE AORTIC STENOSIS: Status: ACTIVE | Noted: 2023-05-12

## 2023-11-07 PROBLEM — I47.10 PAROXYSMAL SUPRAVENTRICULAR TACHYCARDIA: Status: ACTIVE | Noted: 2019-04-30

## 2023-11-08 ENCOUNTER — APPOINTMENT (OUTPATIENT)
Dept: CARDIOTHORACIC SURGERY | Facility: CLINIC | Age: 53
End: 2023-11-08
Payer: COMMERCIAL

## 2023-11-08 VITALS
OXYGEN SATURATION: 97 % | RESPIRATION RATE: 14 BRPM | HEIGHT: 67 IN | BODY MASS INDEX: 28.25 KG/M2 | WEIGHT: 180 LBS | SYSTOLIC BLOOD PRESSURE: 138 MMHG | DIASTOLIC BLOOD PRESSURE: 89 MMHG | HEART RATE: 85 BPM | TEMPERATURE: 98.4 F

## 2023-11-08 DIAGNOSIS — I35.0 NONRHEUMATIC AORTIC (VALVE) STENOSIS: ICD-10-CM

## 2023-11-08 DIAGNOSIS — I47.10 SUPRAVENTRICULAR TACHYCARDIA, UNSPECIFIED: ICD-10-CM

## 2023-11-08 PROCEDURE — 99214 OFFICE O/P EST MOD 30 MIN: CPT

## 2024-01-31 ENCOUNTER — APPOINTMENT (OUTPATIENT)
Dept: CARDIOLOGY | Facility: CLINIC | Age: 54
End: 2024-01-31
Payer: COMMERCIAL

## 2024-01-31 ENCOUNTER — NON-APPOINTMENT (OUTPATIENT)
Age: 54
End: 2024-01-31

## 2024-01-31 VITALS
WEIGHT: 180 LBS | HEART RATE: 71 BPM | RESPIRATION RATE: 14 BRPM | SYSTOLIC BLOOD PRESSURE: 112 MMHG | OXYGEN SATURATION: 96 % | HEIGHT: 67 IN | BODY MASS INDEX: 28.25 KG/M2 | DIASTOLIC BLOOD PRESSURE: 77 MMHG

## 2024-01-31 VITALS — SYSTOLIC BLOOD PRESSURE: 112 MMHG | DIASTOLIC BLOOD PRESSURE: 70 MMHG

## 2024-01-31 DIAGNOSIS — I10 ESSENTIAL (PRIMARY) HYPERTENSION: ICD-10-CM

## 2024-01-31 DIAGNOSIS — E78.5 HYPERLIPIDEMIA, UNSPECIFIED: ICD-10-CM

## 2024-01-31 LAB
25(OH)D3 SERPL-MCNC: 51.4 NG/ML
ALBUMIN SERPL ELPH-MCNC: 4.6 G/DL
ALP BLD-CCNC: 63 U/L
ALT SERPL-CCNC: 35 U/L
ANION GAP SERPL CALC-SCNC: 12 MMOL/L
APPEARANCE: CLEAR
AST SERPL-CCNC: 25 U/L
BASOPHILS # BLD AUTO: 0.05 K/UL
BASOPHILS NFR BLD AUTO: 1 %
BILIRUB SERPL-MCNC: 1.4 MG/DL
BILIRUBIN URINE: NEGATIVE
BLOOD URINE: NEGATIVE
BUN SERPL-MCNC: 19 MG/DL
CALCIUM SERPL-MCNC: 9.7 MG/DL
CHLORIDE SERPL-SCNC: 101 MMOL/L
CHOLEST SERPL-MCNC: 148 MG/DL
CK SERPL-CCNC: 168 U/L
CO2 SERPL-SCNC: 27 MMOL/L
COLOR: YELLOW
CREAT SERPL-MCNC: 0.97 MG/DL
CREAT SPEC-SCNC: 35 MG/DL
EGFR: 93 ML/MIN/1.73M2
EOSINOPHIL # BLD AUTO: 0.17 K/UL
EOSINOPHIL NFR BLD AUTO: 3.4 %
ESTIMATED AVERAGE GLUCOSE: 100 MG/DL
FOLATE SERPL-MCNC: >20 NG/ML
GLUCOSE QUALITATIVE U: NEGATIVE MG/DL
GLUCOSE SERPL-MCNC: 75 MG/DL
HBA1C MFR BLD HPLC: 5.1 %
HCT VFR BLD CALC: 48.1 %
HDLC SERPL-MCNC: 50 MG/DL
HGB BLD-MCNC: 16.3 G/DL
IMM GRANULOCYTES NFR BLD AUTO: 0.2 %
KETONES URINE: NEGATIVE MG/DL
LDLC SERPL CALC-MCNC: 75 MG/DL
LEUKOCYTE ESTERASE URINE: NEGATIVE
LYMPHOCYTES # BLD AUTO: 1.08 K/UL
LYMPHOCYTES NFR BLD AUTO: 21.7 %
MAN DIFF?: NORMAL
MCHC RBC-ENTMCNC: 32.4 PG
MCHC RBC-ENTMCNC: 33.9 GM/DL
MCV RBC AUTO: 95.6 FL
MICROALBUMIN 24H UR DL<=1MG/L-MCNC: <1.2 MG/DL
MICROALBUMIN/CREAT 24H UR-RTO: NORMAL MG/G
MONOCYTES # BLD AUTO: 0.41 K/UL
MONOCYTES NFR BLD AUTO: 8.2 %
NEUTROPHILS # BLD AUTO: 3.26 K/UL
NEUTROPHILS NFR BLD AUTO: 65.5 %
NITRITE URINE: NEGATIVE
NONHDLC SERPL-MCNC: 97 MG/DL
PH URINE: 6
PLATELET # BLD AUTO: 199 K/UL
POTASSIUM SERPL-SCNC: 4.2 MMOL/L
PROT SERPL-MCNC: 7.5 G/DL
PROTEIN URINE: NEGATIVE MG/DL
RBC # BLD: 5.03 M/UL
RBC # FLD: 12.9 %
SODIUM SERPL-SCNC: 140 MMOL/L
SPECIFIC GRAVITY URINE: 1.01
TRIGL SERPL-MCNC: 126 MG/DL
TSH SERPL-ACNC: 2.36 UIU/ML
UROBILINOGEN URINE: 0.2 MG/DL
VIT B12 SERPL-MCNC: 467 PG/ML
WBC # FLD AUTO: 4.98 K/UL

## 2024-01-31 PROCEDURE — XXXXX: CPT | Mod: 1L

## 2024-01-31 NOTE — HISTORY OF PRESENT ILLNESS
[FreeTextEntry1] : Patient is a 53 year old man with a history of coronary artery disease status post drug-eluting stents to the mid RCA and mid LAD 1/14/2016, now status post JOHN to the mid LAD on 3/9/2023, hypertension, hyperlipidemia, family history of coronary artery disease, bicuspid aortic valve with aortic stenosis, ascending aortic aneurysm, and mediastinal hematoma (since resolved) who presents today for follow up of coronary artery disease, hyperlipidemia, and HTN.  He mentions experiencing episodes of chest discomfort on occasion when he goes up an incline.  He states that he walks in the morning without any issues. He walked 3 miles this morning without any issues. He otherwise denies any shortness of breath at rest, dizziness, palpitations, and headaches.

## 2024-01-31 NOTE — DISCUSSION/SUMMARY
[FreeTextEntry1] : IMPRESSION: Mr. Aiken is a 53 year old man with a history of coronary artery disease status post RESOLUTE JOHN to the mid RCA and mid LAD 1/2016, now status post JOHN to the mid LAD on 3/9/2023, hypertension, hyperlipidemia, family history of coronary artery disease, bicuspid aortic valve with aortic stenosis, ascending aortic aneurysm, mediastinal hematoma (since resolved) who presents today for follow up of coronary artery disease, hyperlipidemia, and HTN.   PLAN: 1. He had a 24 hour Holter monitor that did not reveal any significant ectopy. There was no ectopy on exam or on his ECG that was performed today.  He will continue on Toprol XL 25 mg twice daily for suppression of his ectopy.  2. His blood pressure is good today, thus he will continue on diet modification along with decreased alcohol intake. He will also continue on Amlodipine 5 mg twice daily and Toprol XL.  3. He had a JOHN placed to the mid LAD about 6 months ago. He will continue on ASA 81 mg daily and Brilinta 90 mg twice daily.  I will be checking a CBC today.  4. His MRA showed a moderately dilated mid ascending aorta for which he will continue to follow up with Dr. Johansen.   4. He will continue on Lipitor 20 mg daily given his hyperlipidemia. I will be checking a lipid profile today in addition to a CMP.  5. He will follow up with me in 4-6 months or sooner should he experience any symptoms in the interim.  He is scheduled to have an echocardiogram tomorrow to follow up his aortic stenosis, 6.  I spent approximately 45 to 50 minutes with the patient during this encounter, which included the time spent on documentation.

## 2024-01-31 NOTE — CARDIOLOGY SUMMARY
[LVEF ___%] : LVEF [unfilled]% [None] : no pulmonary hypertension [Mild] : mild mitral regurgitation [___] : [unfilled] [de-identified] : 1/31/2024: Sinus Rhythm at 69 bpm  [de-identified] :  9/26/2023 CONCLUSIONS: 1. Normal left ventricular systolic function. EF is approximately 60%. Concentric left ventricular remodeling. 2. There is mild (grade 1) left ventricular diastolic dysfunction. 3. Right ventricular cavity is mildly enlarged in size and normal systolic function. 4. The right atrium is mildly dilated in size. 5. Trace mitral regurgitation. 6. Mitral annular calcification with calcified mitral valve leaflets. 7. Calcified bicuspid aortic valve with decreased opening. The aortic valve area is approximately 0.9 sqcm, by the continuity equation, which is consistent with severe aortic stenosis. 8. No echocardiographic evidence of pulmonary hypertension. 9. Mild tricuspid regurgitation. 10. Estimated pulmonary artery systolic pressure is 27 mmHg. 11. Mild aortic regurgitation. 12. The aortic root at the sinuses of Valsalva is moderately dilated, measuring 4.75 cm (indexed 2.45 cm/m). The ascending aorta diameter is moderately dilated, measuring 4.8 cm (indexed 2.45 cm/m). 13. Trace pericardial effusion noted adjacent to the right atrium.    3/21/2023: Normal left ventricular systolic function. EF is approximately 60%. Concentric left ventricular remodeling. Mild left ventricular diastolic dysfunction. Mildly enlarged right ventricular cavity size and normal systolic function. The right atrium is mildly dilated. Calcified bicuspid aortic with decreased opening. The aortic valve area is approximately 1.1 sqcm, by the continuity equation, which is consistent with moderate aortic stenosis. Mid tricuspid regurgitation. No pulmonary hypertension. PASP= 21 mmHg. Thickened mitral valve leaflets. Mild mitral regurgitation. The aortic root at sinuses of Valsalva is moderately dilated, measuring approximately 4.75 cm. The proximal ascending aorta is moderately dilated, measuring approximately 4.8 cm.    12/1/2021: Calcified bicuspid aortic valve. There is raphe formation of the right coronary cusp and left coronary cusp of the aortic valve. Peak transaortic valve gradient equals 21 mm Hg, mean transaortic valve gradient equals 12 mm Hg, estimated aortic valve area equals 1.3 sqcm (by continuity equation), consistent with moderate aortic stenosis. Mild aortic regurgitation. Mild to moderate aortic root dilatation  (Ao: 4.5 cm at the sinuses of Valsalva). The proximal ascending aorta is moderately dilated, measuring approximately 4.8 cm. Concentric left ventricular remodeling. Normal left ventricular systolic function. No segmental wall motion abnormalities. EF is approximately 60%. Mild diastolic dysfunction. Mild right atrial enlargement. Mild right ventricular enlargement with normal right ventricular systolic function. PASP= 29 mm Hg. Mild tricuspid regurgitation. [de-identified] : Cardiac catheterization performed on 3/9/2023: 30% proximal LAD stenosis. 70% mid LAD in-stent stenosis. 40% stenosis of the second Diagonal. 20% in-stent restenosis of the mid RCA. 30% stenosis of the distal RCA. \par  \par  3/9/2023: 3 x 8 mm XIENCE Skypoint JOHN of the mid LAD.

## 2024-01-31 NOTE — PHYSICAL EXAM
[General Appearance - Well Developed] : well developed [Normal Appearance] : normal appearance [Well Groomed] : well groomed [General Appearance - Well Nourished] : well nourished [No Deformities] : no deformities [General Appearance - In No Acute Distress] : no acute distress [Normal Conjunctiva] : the conjunctiva exhibited no abnormalities [Normal Oral Mucosa] : normal oral mucosa [No Oral Pallor] : no oral pallor [No Oral Cyanosis] : no oral cyanosis [Normal Jugular Venous A Waves Present] : normal jugular venous A waves present [Normal Jugular Venous V Waves Present] : normal jugular venous V waves present [Respiration, Rhythm And Depth] : normal respiratory rhythm and effort [Exaggerated Use Of Accessory Muscles For Inspiration] : no accessory muscle use [Auscultation Breath Sounds / Voice Sounds] : lungs were clear to auscultation bilaterally [Normal Rate] : normal [Rhythm Regular] : regular [Normal S1] : normal S1 [Normal S2] : normal S2 [I] : a grade 1 [II] : a grade 2 [No Pitting Edema] : no pitting edema present [Bowel Sounds] : normal bowel sounds [Abdomen Soft] : soft [Abdomen Tenderness] : non-tender [Abnormal Walk] : normal gait [Gait - Sufficient For Exercise Testing] : the gait was sufficient for exercise testing [Nail Clubbing] : no clubbing of the fingernails [Cyanosis, Localized] : no localized cyanosis [Petechial Hemorrhages (___cm)] : no petechial hemorrhages [Skin Color & Pigmentation] : normal skin color and pigmentation [] : no rash [No Skin Ulcers] : no skin ulcer [Oriented To Time, Place, And Person] : oriented to person, place, and time [Affect] : the affect was normal [Mood] : the mood was normal [No Anxiety] : not feeling anxious [FreeTextEntry1] : Extraocular muscles intact. Anicteric sclerae. [S3] : no S3 [Right Carotid Bruit] : no bruit heard over the right carotid [Left Carotid Bruit] : no bruit heard over the left carotid [Bruit] : no bruit heard

## 2024-02-10 LAB
APPEARANCE: CLEAR
BILIRUBIN URINE: NEGATIVE
BLOOD URINE: NEGATIVE
COLOR: YELLOW
GLUCOSE QUALITATIVE U: NEGATIVE MG/DL
KETONES URINE: NEGATIVE MG/DL
LEUKOCYTE ESTERASE URINE: NEGATIVE
NITRITE URINE: NEGATIVE
PH URINE: 6
PROTEIN URINE: NEGATIVE MG/DL
SPECIFIC GRAVITY URINE: 1.01
UROBILINOGEN URINE: 0.2 MG/DL

## 2024-02-29 ENCOUNTER — APPOINTMENT (OUTPATIENT)
Dept: MRI IMAGING | Facility: CLINIC | Age: 54
End: 2024-02-29

## 2024-03-02 ENCOUNTER — APPOINTMENT (OUTPATIENT)
Dept: MRI IMAGING | Facility: CLINIC | Age: 54
End: 2024-03-02
Payer: COMMERCIAL

## 2024-03-02 ENCOUNTER — OUTPATIENT (OUTPATIENT)
Dept: OUTPATIENT SERVICES | Facility: HOSPITAL | Age: 54
LOS: 1 days | End: 2024-03-02
Payer: COMMERCIAL

## 2024-03-02 DIAGNOSIS — Q23.1 CONGENITAL INSUFFICIENCY OF AORTIC VALVE: ICD-10-CM

## 2024-03-02 DIAGNOSIS — Z98.890 OTHER SPECIFIED POSTPROCEDURAL STATES: Chronic | ICD-10-CM

## 2024-03-02 DIAGNOSIS — N44.00 TORSION OF TESTIS, UNSPECIFIED: Chronic | ICD-10-CM

## 2024-03-02 PROCEDURE — 71555 MRI ANGIO CHEST W OR W/O DYE: CPT | Mod: 26

## 2024-03-02 PROCEDURE — C8911: CPT

## 2024-03-02 PROCEDURE — A9585: CPT

## 2024-04-03 ENCOUNTER — APPOINTMENT (OUTPATIENT)
Dept: CV DIAGNOSITCS | Facility: HOSPITAL | Age: 54
End: 2024-04-03

## 2024-04-03 ENCOUNTER — RESULT REVIEW (OUTPATIENT)
Age: 54
End: 2024-04-03

## 2024-04-03 ENCOUNTER — OUTPATIENT (OUTPATIENT)
Dept: OUTPATIENT SERVICES | Facility: HOSPITAL | Age: 54
LOS: 1 days | End: 2024-04-03
Payer: COMMERCIAL

## 2024-04-03 DIAGNOSIS — Q23.1 CONGENITAL INSUFFICIENCY OF AORTIC VALVE: ICD-10-CM

## 2024-04-03 DIAGNOSIS — Z98.890 OTHER SPECIFIED POSTPROCEDURAL STATES: Chronic | ICD-10-CM

## 2024-04-03 DIAGNOSIS — N44.00 TORSION OF TESTIS, UNSPECIFIED: Chronic | ICD-10-CM

## 2024-04-03 PROCEDURE — 93306 TTE W/DOPPLER COMPLETE: CPT | Mod: 26

## 2024-04-03 PROCEDURE — 93306 TTE W/DOPPLER COMPLETE: CPT

## 2024-04-10 ENCOUNTER — APPOINTMENT (OUTPATIENT)
Dept: CARDIOTHORACIC SURGERY | Facility: CLINIC | Age: 54
End: 2024-04-10
Payer: COMMERCIAL

## 2024-04-10 VITALS
DIASTOLIC BLOOD PRESSURE: 86 MMHG | TEMPERATURE: 98 F | OXYGEN SATURATION: 97 % | HEIGHT: 67 IN | HEART RATE: 72 BPM | WEIGHT: 180 LBS | SYSTOLIC BLOOD PRESSURE: 128 MMHG | BODY MASS INDEX: 28.25 KG/M2

## 2024-04-10 DIAGNOSIS — Q23.1 CONGENITAL INSUFFICIENCY OF AORTIC VALVE: ICD-10-CM

## 2024-04-10 DIAGNOSIS — I71.21 ANEURYSM OF THE ASCENDING AORTA, WITHOUT RUPTURE: ICD-10-CM

## 2024-04-10 PROCEDURE — 99214 OFFICE O/P EST MOD 30 MIN: CPT

## 2024-04-10 NOTE — PHYSICAL EXAM
[Sclera] : the sclera and conjunctiva were normal [Neck Appearance] : the appearance of the neck was normal [Jugular Venous Distention Increased] : there was no jugular-venous distention [] : no respiratory distress [Respiration, Rhythm And Depth] : normal respiratory rhythm and effort [Exaggerated Use Of Accessory Muscles For Inspiration] : no accessory muscle use [Auscultation Breath Sounds / Voice Sounds] : lungs were clear to auscultation bilaterally [Apical Impulse] : the apical impulse was normal [Heart Sounds] : normal S1 and S2 [Heart Rate And Rhythm] : heart rate was normal and rhythm regular [Abdomen Soft] : soft [Abdomen Tenderness] : non-tender [No Focal Deficits] : no focal deficits [Involuntary Movements] : no involuntary movements were seen [Oriented To Time, Place, And Person] : oriented to person, place, and time [Impaired Insight] : insight and judgment were intact [Affect] : the affect was normal [Mood] : the mood was normal

## 2024-04-10 NOTE — HISTORY OF PRESENT ILLNESS
[FreeTextEntry1] : 53-year-old male with a past medical history to include coronary artery disease status post drug-eluting stents to the mid RCA and mid LAD 1/14/2016, hypertension, hyperlipidemia, family history of coronary artery disease, and mediastinal hematoma (since resolved), presents for follow up visit in 6 months with recent imaging for evaluation and management of bicuspid aortic valve with aortic stenosis, ascending aortic aneurysm.   TTE 4/3/2024  1. Left ventricular cavity is normal in size. Left ventricular wall thickness is normal. Left ventricular systolic function is normal with an ejection fraction of 66 % by Givens's method of disks. There are no regional wall motion abnormalities seen.  2. Normal left ventricular diastolic function, with normal filling pressure.  3. Normal right ventricular cavity size, with normal wall thickness, and normal systolic function.  4. Normal left and right atrial size.  5. No pericardial effusion seen.  6. Aortic root at the sinuses of Valsalva is dilated, measuring 4.55 cm (indexed 2.35 cm/m).  7. Bicuspid aortic valve. moderate aortic stenosis with a mean transaortic gradient is 15.0 mmHg.   8. Compared to the transesophageal echocardiogram performed on 10/9/2023, there have been no significant interval changes.  Presents today and report doing well.  Working for [employer] as .  Denies chest pain, back pain, dizziness, SOB, swelling, weight gain, palpitations, cough, fever or chills.

## 2024-04-10 NOTE — END OF VISIT
[FreeTextEntry3] : Written by Reji Dia NP, acting as a scribe for Dr. Johansen. The documentation recorded by the scribe accurately reflects the service I personally performed and the decisions made by me. Signature Michael Johansen MD.

## 2024-04-10 NOTE — PROCEDURE
[FreeTextEntry1] : Dr. Johansen reviewed the indications for surgery, and used our webpage [email or web address] <[email or web address]> to illustrate the aorta and anatomy of the heart. Those indications are the following: size greater than 5.0 cm, symptomatic aneurysms, family history of aortic dissection or aneurysm death with a size greater than 4.5 cm, other necessary cardiac procedures such as coronary artery bypass grafting or valvular disorders with an aneurysm greater than 4.5 cm, or connective tissue disorders with an aneurysm size greater than 4.5 cm. The patient does not meet size criteria for surgical intervention at the time.  Dr. Johansen discussed activity restrictions with the patient, and would advise exercise at a moderate amount with no heavy lifting over one third of body weight, and avoiding heart rates that exceed 140 beats per minute. In addition, every patient should abstain from tobacco abuse and to avoid all illicit drug use, especially stimulants such as cocaine or methamphetamine. Dr. Johansen also counseled regarding maintaining a healthy heart diet, and losing any excessive weight as this also put undue stress on both the aorta and entire cardiovascular system. First degree family members should be screened for bicuspid valve disease, and ascending aortic aneurysms.   Patient was advised to view the educational video prior to this visit regarding aortic pathology, risk factors, surgical procedures, and lifestyle modifications. Video can be retrieved at [email or web address].

## 2024-04-10 NOTE — DATA REVIEWED
[FreeTextEntry1] : 10/9/23 CHEY revealed . Left ventricular systolic function is normal with an ejection fraction visually estimated at 60 to 65 %. There are no regional wall motion abnormalities seen. 2. Normal right ventricular cavity size, wall thickness and systolic function. 3. Mild mitral regurgitation. 4. Bicuspid aortic valve with reduced systolic excursion. mild to moderate aortic stenosis. 5. There is fusion of the left and right cusps of the aortic valve. Calcified raphe seen between the right and left coronary cusps. Peak AV gradient equals 25 mmHg, mean AV gradient equals 14 mmHg, dimensionless index 0.29, calculated MARY equals 1.54 cmsq (LVOT diam 2.6 cm). MARY by 3D planimetry equals 1.8 cmsq. Findings are consistent with mild-moderate aortic stenosis. 6. No evidence of aortic regurgitation. 7. Aortic root at the sinuses of Valsalva is dilated, measuring 4.40 cm (indexed 2.28 cm/m). Ascending aorta diameter is dilated, measuring 4.70 cm (indexed 2.43 cm/m). 8. No evidence of left atrial or left atrial appendage thrombus. The left atrial appendage emptying velocity is normal at 89 cm/s. 9. Mild non-mobile focal atheroma in the visualized portions of the transverse aortic arch. minimal non-mobile atheroma in the visualized portions of the descending aorta.   9/26/23 TTE revealed Normal left ventricular systolic function. EF is approximately 60%. Concentric left ventricular remodeling. 2. There is mild (grade 1) left ventricular diastolic dysfunction. 3. Right ventricular cavity is mildly enlarged in size and normal systolic function. 4. The right atrium is mildly dilated in size. 5. Trace mitral regurgitation. 6. Mitral annular calcification with calcified mitral valve leaflets. 7. Calcified bicuspid aortic valve with decreased opening. The aortic valve area is approximately 0.9 sqcm, by the continuity equation, which is consistent with severe aortic stenosis. 8. No echocardiographic evidence of pulmonary hypertension. 9. Mild tricuspid regurgitation. 10. Estimated pulmonary artery systolic pressure is 27 mmHg. 11. Mild aortic regurgitation. 12. The aortic root at the sinuses of Valsalva is moderately dilated, measuring 4.75 cm (indexed 2.45 cm/m). The ascending aorta diameter is moderately dilated, measuring 4.8 cm (indexed 2.45 cm/m). 13. Trace pericardial effusion noted adjacent to the right atrium.  4/29/23 MRA chest revealed Stable thoracic aorta measuring up to 4.6 cm in the mid ascending segment and up to 4.4 cm at the sinuses of Valsalva in the setting of a bicuspid aortic valve. - No aortic dissection.  3/21/23 TTE revealed Normal left ventricular systolic function. EF is approximately 60%. Concentric left ventricular remodeling. 2. Mild (grade 1) left ventricular diastolic dysfunction. 3. Mildly enlarged right ventricular cavity size and normal systolic function. 4. The right atrium is mildly dilated. 5. Calcified bicuspid aortic with decreased opening. The aortic valve area is approximately 1.1 sqcm, by the continuity equation, which is consistent with moderate aortic stenosis. 6. Mid tricuspid regurgitation. No echocardiographic evidence of pulmonary hypertension. PASP= 21 mmHg. 7. Thickened mitral valve leaflets. Mild mitral regurgitation. 8. Trace pericardial effusion. 9. The aortic root at sinuses of Valsalva is moderately dilated, measuring approximately 4.75 cm. The proximal ascending aorta is moderately dilated, measuring approximately 4.8 cm.  11/9/20 MRA Chest revealed mid ascending aorta measures up to 4.6 cm, previously 4.4 cm.  6/8/20 TTE revealed EF 60%, minimal mitral regurgitation, calcified aortic valve probably bicuspid with decreased opening.Peak trans aortic gradient 19 mm hg, mean trans aortic gradient 13 mm hg, estimated MARY 1.3 sq cm consistent with moderate aortic stenosis. Moderate aortic root dilatation 4.6 cm at sinuses of Valsalva. proximal ascending aorta 4.8 cm.  MRA of the Chest revealed 12/5/2018: -Sinuses of Valsalva: 43 mm -Ascending thoracic aorta: 44 mm, stable from 12/12/2016 -Descending thoracic aorta: 22 mm   CT chest 12/12/16 revealed: ascending aorta 4.3cm. MRI chest 12/28/16 revealed: aortic root measuring 4.3 cm, the ascending aorta 4.7 cm, the aortic arch 2.6 cm, and the descending aorta 2.5 cm.

## 2024-04-10 NOTE — ASSESSMENT
[FreeTextEntry1] : I have reviewed the patient's medical records, diagnostic images during the time of this office consultation and have made the following recommendation. TTE was completed of the thoracic aorta and aortic valve. The reports were reviewed and noted in the chart, I independently reviewed and interpreted images and reports and I reviewed the films/CD and agree.  Review of the imaging shows his aortic pathology has remained stable and does not require surgical intervention.   Plan  - Follow up [facility] 6 months with repeat TTE - Continue medication regimen. - Follow up with cardiologist and PCP. - Blood pressure management. - Discussed signs and symptoms that warrant emergency medical attention.

## 2024-04-24 ENCOUNTER — NON-APPOINTMENT (OUTPATIENT)
Age: 54
End: 2024-04-24

## 2024-04-25 ENCOUNTER — APPOINTMENT (OUTPATIENT)
Dept: ORTHOPEDIC SURGERY | Facility: CLINIC | Age: 54
End: 2024-04-25
Payer: COMMERCIAL

## 2024-04-25 VITALS
HEART RATE: 87 BPM | SYSTOLIC BLOOD PRESSURE: 127 MMHG | DIASTOLIC BLOOD PRESSURE: 85 MMHG | HEIGHT: 67 IN | WEIGHT: 180 LBS | BODY MASS INDEX: 28.25 KG/M2

## 2024-04-25 DIAGNOSIS — M54.50 LOW BACK PAIN, UNSPECIFIED: ICD-10-CM

## 2024-04-25 DIAGNOSIS — M54.30 SCIATICA, UNSPECIFIED SIDE: ICD-10-CM

## 2024-04-25 PROCEDURE — 99203 OFFICE O/P NEW LOW 30 MIN: CPT

## 2024-04-25 PROCEDURE — 72100 X-RAY EXAM L-S SPINE 2/3 VWS: CPT

## 2024-04-25 NOTE — DISCUSSION/SUMMARY
[de-identified] : The patient has lower back pain on the basis of lumbar spondylosis.  He has some numbness in his right great toe but no other neurologic complaints or findings.  This may or may not be related to his lumbar spine.  He would like to try a course of physical therapy for his back pain.  He is given a referral.  He will be reevaluated in 1 month.  If neurologic status deteriorates, he will contact us.

## 2024-04-25 NOTE — HISTORY OF PRESENT ILLNESS
[de-identified] : 53-year-old male  presents for initial evaluation low back pain x 2 weeks. Denies trauma or injury. He complains of constant dull and aching pain on the right side of the low back with radiation down the right leg. He feels the pain is worse with prolonged sitting, walking, bending. He reports numbness in the great toe of the right foot. He has gone to a chiropractor twice which he felt made his pain worse. He is on Brilinta for an aortic aneurysm, is unable to take NSAIDs.

## 2024-04-25 NOTE — PHYSICAL EXAM
[DP] : dorsalis pedis 2+ and symmetric bilaterally [PT] : posterior tibial 2+ and symmetric bilaterally [Normal] : Alert and in no acute distress [Poor Appearance] : well-appearing [Acute Distress] : not in acute distress [Obese] : not obese [de-identified] : AP and lateral x-rays of the lumbar spine demonstrate no fracture or dislocation.  There are degenerative changes. [de-identified] : The patient has no respiratory distress. Mood and affect are normal. The patient is alert and oriented to person, place and time. Examination of the lumbar spine demonstrates tenderness to the right of the midline. There is mild muscle spasm. There is no deformity. Lumbar flexion is 90, right lateral flexion 10 and left lateral flexion 10. Straight leg raise test is negative. Lower extremity neurologic exam is intact with regard to sensation, motor function and deep tendon reflexes.  There is decrease sensation of the right great toe.  Sensory exam is otherwise intact.

## 2024-05-02 ENCOUNTER — APPOINTMENT (OUTPATIENT)
Dept: ORTHOPEDIC SURGERY | Facility: CLINIC | Age: 54
End: 2024-05-02

## 2024-06-17 ENCOUNTER — NON-APPOINTMENT (OUTPATIENT)
Age: 54
End: 2024-06-17

## 2024-06-17 ENCOUNTER — APPOINTMENT (OUTPATIENT)
Dept: CARDIOLOGY | Facility: CLINIC | Age: 54
End: 2024-06-17
Payer: COMMERCIAL

## 2024-06-17 VITALS
HEIGHT: 67 IN | SYSTOLIC BLOOD PRESSURE: 113 MMHG | RESPIRATION RATE: 18 BRPM | WEIGHT: 185 LBS | BODY MASS INDEX: 29.03 KG/M2 | DIASTOLIC BLOOD PRESSURE: 76 MMHG | OXYGEN SATURATION: 96 % | HEART RATE: 73 BPM

## 2024-06-17 VITALS — DIASTOLIC BLOOD PRESSURE: 76 MMHG | SYSTOLIC BLOOD PRESSURE: 120 MMHG

## 2024-06-17 DIAGNOSIS — I71.9 AORTIC ANEURYSM OF UNSPECIFIED SITE, W/OUT RUPTURE: ICD-10-CM

## 2024-06-17 DIAGNOSIS — I25.10 ATHEROSCLEROTIC HEART DISEASE OF NATIVE CORONARY ARTERY W/OUT ANGINA PECTORIS: ICD-10-CM

## 2024-06-17 LAB
25(OH)D3 SERPL-MCNC: 38.3 NG/ML
ALBUMIN SERPL ELPH-MCNC: 4.8 G/DL
ALP BLD-CCNC: 67 U/L
ALT SERPL-CCNC: 39 U/L
ANION GAP SERPL CALC-SCNC: 12 MMOL/L
AST SERPL-CCNC: 32 U/L
BASOPHILS # BLD AUTO: 0.04 K/UL
BASOPHILS NFR BLD AUTO: 0.8 %
BILIRUB SERPL-MCNC: 1.2 MG/DL
BUN SERPL-MCNC: 14 MG/DL
CALCIUM SERPL-MCNC: 9.4 MG/DL
CHLORIDE SERPL-SCNC: 102 MMOL/L
CHOLEST SERPL-MCNC: 165 MG/DL
CO2 SERPL-SCNC: 25 MMOL/L
CREAT SERPL-MCNC: 1.04 MG/DL
EGFR: 85 ML/MIN/1.73M2
EOSINOPHIL # BLD AUTO: 0.1 K/UL
EOSINOPHIL NFR BLD AUTO: 1.9 %
FOLATE SERPL-MCNC: >20 NG/ML
GLUCOSE SERPL-MCNC: 97 MG/DL
HCT VFR BLD CALC: 48 %
HDLC SERPL-MCNC: 56 MG/DL
HGB BLD-MCNC: 16.3 G/DL
IMM GRANULOCYTES NFR BLD AUTO: 0.4 %
LDLC SERPL CALC-MCNC: 86 MG/DL
LYMPHOCYTES # BLD AUTO: 1.07 K/UL
LYMPHOCYTES NFR BLD AUTO: 20.6 %
MAN DIFF?: NORMAL
MCHC RBC-ENTMCNC: 32.1 PG
MCHC RBC-ENTMCNC: 34 GM/DL
MCV RBC AUTO: 94.7 FL
MONOCYTES # BLD AUTO: 0.54 K/UL
MONOCYTES NFR BLD AUTO: 10.4 %
NEUTROPHILS # BLD AUTO: 3.42 K/UL
NEUTROPHILS NFR BLD AUTO: 65.9 %
NONHDLC SERPL-MCNC: 108 MG/DL
PLATELET # BLD AUTO: 212 K/UL
POTASSIUM SERPL-SCNC: 4.7 MMOL/L
PROT SERPL-MCNC: 7.5 G/DL
RBC # BLD: 5.07 M/UL
RBC # FLD: 12.5 %
SODIUM SERPL-SCNC: 139 MMOL/L
TRIGL SERPL-MCNC: 123 MG/DL
TSH SERPL-ACNC: 1.69 UIU/ML
VIT B12 SERPL-MCNC: 631 PG/ML
WBC # FLD AUTO: 5.19 K/UL

## 2024-06-17 PROCEDURE — 99214 OFFICE O/P EST MOD 30 MIN: CPT

## 2024-06-17 PROCEDURE — G2211 COMPLEX E/M VISIT ADD ON: CPT

## 2024-06-17 PROCEDURE — 93000 ELECTROCARDIOGRAM COMPLETE: CPT

## 2024-06-17 RX ORDER — TICAGRELOR 60 MG/1
60 TABLET ORAL TWICE DAILY
Qty: 180 | Refills: 1 | Status: ACTIVE | COMMUNITY
Start: 2017-01-10 | End: 1900-01-01

## 2024-06-17 RX ORDER — ASPIRIN ENTERIC COATED TABLETS 81 MG 81 MG/1
81 TABLET, DELAYED RELEASE ORAL
Qty: 90 | Refills: 2 | Status: ACTIVE | COMMUNITY
Start: 2018-12-26 | End: 1900-01-01

## 2024-06-17 NOTE — HISTORY OF PRESENT ILLNESS
[FreeTextEntry1] : Patient is a 53 year old man with a history of coronary artery disease status post drug-eluting stents to the mid RCA and mid LAD 1/14/2016, now status post JOHN to the mid LAD on 3/9/2023, hypertension, hyperlipidemia, family history of coronary artery disease, bicuspid aortic valve with aortic stenosis, ascending aortic aneurysm, and mediastinal hematoma (since resolved) who presents today for follow up of coronary artery disease, hyperlipidemia, and HTN.  He continues to  walk on a regular basis with no limitations. He mentions experiencing episodes of chest discomfort on occasion when he goes up an incline, but does not need to stop and the symptoms go away on their own. His biggest complaint is that he feels tired. He otherwise denies any shortness of breath at rest, dizziness, palpitations, and headaches.

## 2024-06-17 NOTE — CARDIOLOGY SUMMARY
[LVEF ___%] : LVEF [unfilled]% [None] : no pulmonary hypertension [Mild] : mild mitral regurgitation [___] : [unfilled] [de-identified] : 6/17/2024: Sinus Rhythm at 75 bpm  [de-identified] : 4/3/2024: Left ventricular cavity is normal in size. Left ventricular wall thickness is normal. Left ventricular systolic function is normal with an ejection fraction of 66 % by Givens's method of disks. There are no regional wall motion abnormalities seen. Normal left ventricular diastolic function.  Normal right ventricular cavity size, with normal wall thickness, and normal systolic function. Normal left and right atrial size. No pericardial effusion seen. Aortic root at the sinuses of Valsalva is dilated, measuring 4.55 cm. Moderately dilated proximal ascending aorta, measuring 4.6 cm. Bicuspid aortic valve. Moderate aortic stenosis. MARY= 1.18 sqcm.  There is fusion of the right and left coronary cusps.  The peak transaortic velocity is 2.59 m/s, peak transaortic gradient is 26.8 mmHg and mean transaortic gradient is 15.0 mmHg. There is no evidence of aortic regurgitation. There is trace mitral regurgitation. There is trace tricuspid regurgitation. Estimated pulmonary artery systolic pressure is 20 mmHg.   9/26/2023: Normal left ventricular systolic function. EF is approximately 60%. Concentric left ventricular remodeling. There is mild (grade 1) left ventricular diastolic dysfunction. Right ventricular cavity is mildly enlarged in size and normal systolic function. The right atrium is mildly dilated in size. Trace mitral regurgitation. Mitral annular calcification with calcified mitral valve leaflets. Calcified bicuspid aortic valve with decreased opening. The aortic valve area is approximately 0.9 sqcm, by the continuity equation, which is consistent with severe aortic stenosis. No echocardiographic evidence of pulmonary hypertension. Mild tricuspid regurgitation. Estimated pulmonary artery systolic pressure is 27 mmHg. Mild aortic regurgitation. The aortic root at the sinuses of Valsalva is moderately dilated, measuring 4.75 cm. The ascending aorta diameter is moderately dilated, measuring 4.8 cm. Trace pericardial effusion noted adjacent to the right atrium.   3/21/2023: Normal left ventricular systolic function. EF is approximately 60%. Concentric left ventricular remodeling. Mild left ventricular diastolic dysfunction. Mildly enlarged right ventricular cavity size and normal systolic function. The right atrium is mildly dilated. Calcified bicuspid aortic with decreased opening. The aortic valve area is approximately 1.1 sqcm, by the continuity equation, which is consistent with moderate aortic stenosis. Mid tricuspid regurgitation. No pulmonary hypertension. PASP= 21 mmHg. Thickened mitral valve leaflets. Mild mitral regurgitation. The aortic root at sinuses of Valsalva is moderately dilated, measuring approximately 4.75 cm. The proximal ascending aorta is moderately dilated, measuring approximately 4.8 cm.    12/1/2021: Calcified bicuspid aortic valve. There is raphe formation of the right coronary cusp and left coronary cusp of the aortic valve. Peak transaortic valve gradient equals 21 mm Hg, mean transaortic valve gradient equals 12 mm Hg, estimated aortic valve area equals 1.3 sqcm (by continuity equation), consistent with moderate aortic stenosis. Mild aortic regurgitation. Mild to moderate aortic root dilatation  (Ao: 4.5 cm at the sinuses of Valsalva). The proximal ascending aorta is moderately dilated, measuring approximately 4.8 cm. Concentric left ventricular remodeling. Normal left ventricular systolic function. No segmental wall motion abnormalities. EF is approximately 60%. Mild diastolic dysfunction. Mild right atrial enlargement. Mild right ventricular enlargement with normal right ventricular systolic function. PASP= 29 mm Hg. Mild tricuspid regurgitation. [de-identified] : MRA of the chest performed on 3/2/2024: Sinuses of Valsalva- 4.4 cm. Ascending aorta- 4.6 cm. Unchanged hepatic cyst.  [de-identified] : Cardiac catheterization performed on 3/9/2023: 30% proximal LAD stenosis. 70% mid LAD in-stent stenosis. 40% stenosis of the second Diagonal. 20% in-stent restenosis of the mid RCA. 30% stenosis of the distal RCA. \par  \par  3/9/2023: 3 x 8 mm XIENCE Skypoint JOHN of the mid LAD.

## 2024-06-17 NOTE — DISCUSSION/SUMMARY
[FreeTextEntry1] : IMPRESSION: Mr. Aiken is a 53 year old man with a history of coronary artery disease status post RESOLUTE JOHN to the mid RCA and mid LAD 1/2016, now status post JOHN to the mid LAD on 3/9/2023, hypertension, hyperlipidemia, family history of coronary artery disease, bicuspid aortic valve with aortic stenosis, ascending aortic aneurysm, mediastinal hematoma (since resolved) who presents today for follow up of coronary artery disease, hyperlipidemia, and HTN.   PLAN: 1. He had a 24 hour Holter monitor that did not reveal any significant ectopy. There was no ectopy on exam or on his ECG that was performed today.  He will continue on Toprol XL 25 mg twice daily for suppression of his ectopy.  2. His blood pressure is good today, thus he will continue on diet modification along with decreased alcohol intake. He will also continue on Amlodipine 5 mg twice daily and Toprol XL.  3. He had a JOHN placed to the mid LAD about 15 months ago. He will continue on ASA 81 mg daily and given that he is more than a year post stent placement, I am decreasing his Brilinta to 60 mg twice daily.  I will be checking a CBC today.  4. His MRA showed a moderately dilated mid ascending aorta for which he will continue to follow up with Dr. Johansne.   5. He will continue on Lipitor 20 mg daily given his hyperlipidemia. I will be checking a lipid profile today in addition to a CMP.  His LDL has trended up.  5. He will follow up with me in 4 months or sooner should he experience any symptoms in the interim.  He will have a repeat echocardiogram in 4 months to follow up his aortic stenosis.

## 2024-06-25 ENCOUNTER — TRANSCRIPTION ENCOUNTER (OUTPATIENT)
Age: 54
End: 2024-06-25

## 2024-06-25 LAB
25(OH)D3 SERPL-MCNC: 48.4 NG/ML
ALBUMIN SERPL ELPH-MCNC: 4.7 G/DL
ALP BLD-CCNC: 63 U/L
ALT SERPL-CCNC: 36 U/L
ANION GAP SERPL CALC-SCNC: 13 MMOL/L
APPEARANCE: CLEAR
AST SERPL-CCNC: 29 U/L
BASOPHILS # BLD AUTO: 0.07 K/UL
BASOPHILS NFR BLD AUTO: 1.3 %
BILIRUB SERPL-MCNC: 1.3 MG/DL
BILIRUBIN URINE: NEGATIVE
BLOOD URINE: NEGATIVE
BUN SERPL-MCNC: 15 MG/DL
CALCIUM SERPL-MCNC: 9.6 MG/DL
CHLORIDE SERPL-SCNC: 101 MMOL/L
CHOLEST SERPL-MCNC: 156 MG/DL
CO2 SERPL-SCNC: 26 MMOL/L
COLOR: YELLOW
CREAT SERPL-MCNC: 0.97 MG/DL
EGFR: 93 ML/MIN/1.73M2
EOSINOPHIL # BLD AUTO: 0.13 K/UL
EOSINOPHIL NFR BLD AUTO: 2.4 %
ESTIMATED AVERAGE GLUCOSE: 94 MG/DL
FOLATE SERPL-MCNC: >20 NG/ML
GLUCOSE QUALITATIVE U: NEGATIVE MG/DL
GLUCOSE SERPL-MCNC: 95 MG/DL
HBA1C MFR BLD HPLC: 4.9 %
HCT VFR BLD CALC: 48.6 %
HDLC SERPL-MCNC: 56 MG/DL
HGB BLD-MCNC: 16 G/DL
IMM GRANULOCYTES NFR BLD AUTO: 0.4 %
KETONES URINE: NEGATIVE MG/DL
LDLC SERPL CALC-MCNC: 80 MG/DL
LEUKOCYTE ESTERASE URINE: NEGATIVE
LYMPHOCYTES # BLD AUTO: 1.19 K/UL
LYMPHOCYTES NFR BLD AUTO: 22.3 %
MAN DIFF?: NORMAL
MCHC RBC-ENTMCNC: 31.8 PG
MCHC RBC-ENTMCNC: 32.9 GM/DL
MCV RBC AUTO: 96.6 FL
MONOCYTES # BLD AUTO: 0.44 K/UL
MONOCYTES NFR BLD AUTO: 8.2 %
NEUTROPHILS # BLD AUTO: 3.49 K/UL
NEUTROPHILS NFR BLD AUTO: 65.4 %
NITRITE URINE: NEGATIVE
NONHDLC SERPL-MCNC: 101 MG/DL
PH URINE: 6
PLATELET # BLD AUTO: 204 K/UL
POTASSIUM SERPL-SCNC: 4.1 MMOL/L
PROT SERPL-MCNC: 7.8 G/DL
PROTEIN URINE: NEGATIVE MG/DL
PSA SERPL-MCNC: 0.82 NG/ML
RBC # BLD: 5.03 M/UL
RBC # FLD: 12.4 %
SODIUM SERPL-SCNC: 139 MMOL/L
SPECIFIC GRAVITY URINE: 1.01
TRIGL SERPL-MCNC: 113 MG/DL
TSH SERPL-ACNC: 2.33 UIU/ML
UROBILINOGEN URINE: 0.2 MG/DL
VIT B12 SERPL-MCNC: 445 PG/ML
WBC # FLD AUTO: 5.34 K/UL

## 2024-11-26 ENCOUNTER — NON-APPOINTMENT (OUTPATIENT)
Age: 54
End: 2024-11-26

## 2024-11-26 ENCOUNTER — APPOINTMENT (OUTPATIENT)
Dept: CARDIOLOGY | Facility: CLINIC | Age: 54
End: 2024-11-26
Payer: COMMERCIAL

## 2024-11-26 VITALS — SYSTOLIC BLOOD PRESSURE: 124 MMHG | DIASTOLIC BLOOD PRESSURE: 82 MMHG

## 2024-11-26 VITALS
HEIGHT: 67 IN | OXYGEN SATURATION: 98 % | SYSTOLIC BLOOD PRESSURE: 120 MMHG | DIASTOLIC BLOOD PRESSURE: 80 MMHG | BODY MASS INDEX: 28.56 KG/M2 | WEIGHT: 182 LBS | HEART RATE: 76 BPM | RESPIRATION RATE: 12 BRPM

## 2024-11-26 DIAGNOSIS — I71.9 AORTIC ANEURYSM OF UNSPECIFIED SITE, W/OUT RUPTURE: ICD-10-CM

## 2024-11-26 PROCEDURE — 93306 TTE W/DOPPLER COMPLETE: CPT

## 2024-11-26 PROCEDURE — G2211 COMPLEX E/M VISIT ADD ON: CPT | Mod: NC

## 2024-11-26 PROCEDURE — 99214 OFFICE O/P EST MOD 30 MIN: CPT

## 2024-11-26 PROCEDURE — 93000 ELECTROCARDIOGRAM COMPLETE: CPT

## 2024-11-29 LAB
25(OH)D3 SERPL-MCNC: 44 NG/ML
ALBUMIN SERPL ELPH-MCNC: 5 G/DL
ALP BLD-CCNC: 75 U/L
ALT SERPL-CCNC: 44 U/L
ANION GAP SERPL CALC-SCNC: 14 MMOL/L
APPEARANCE: CLEAR
AST SERPL-CCNC: 31 U/L
BASOPHILS # BLD AUTO: 0.07 K/UL
BASOPHILS NFR BLD AUTO: 1.2 %
BILIRUB SERPL-MCNC: 1.1 MG/DL
BILIRUBIN URINE: NEGATIVE
BLOOD URINE: NEGATIVE
BUN SERPL-MCNC: 13 MG/DL
CALCIUM SERPL-MCNC: 9.9 MG/DL
CHLORIDE SERPL-SCNC: 100 MMOL/L
CHOLEST SERPL-MCNC: 158 MG/DL
CO2 SERPL-SCNC: 27 MMOL/L
COLOR: YELLOW
CREAT SERPL-MCNC: 1 MG/DL
EGFR: 89 ML/MIN/1.73M2
EOSINOPHIL # BLD AUTO: 0.2 K/UL
EOSINOPHIL NFR BLD AUTO: 3.4 %
ESTIMATED AVERAGE GLUCOSE: 97 MG/DL
FOLATE SERPL-MCNC: 12 NG/ML
GLUCOSE QUALITATIVE U: NEGATIVE MG/DL
GLUCOSE SERPL-MCNC: 88 MG/DL
HBA1C MFR BLD HPLC: 5 %
HCT VFR BLD CALC: 48.9 %
HDLC SERPL-MCNC: 56 MG/DL
HGB BLD-MCNC: 16.4 G/DL
IMM GRANULOCYTES NFR BLD AUTO: 0.3 %
KETONES URINE: NEGATIVE MG/DL
LDLC SERPL CALC-MCNC: 74 MG/DL
LEUKOCYTE ESTERASE URINE: NEGATIVE
LYMPHOCYTES # BLD AUTO: 1.12 K/UL
LYMPHOCYTES NFR BLD AUTO: 19.1 %
MAN DIFF?: NORMAL
MCHC RBC-ENTMCNC: 31.9 PG
MCHC RBC-ENTMCNC: 33.5 G/DL
MCV RBC AUTO: 95.1 FL
MONOCYTES # BLD AUTO: 0.52 K/UL
MONOCYTES NFR BLD AUTO: 8.9 %
NEUTROPHILS # BLD AUTO: 3.94 K/UL
NEUTROPHILS NFR BLD AUTO: 67.1 %
NITRITE URINE: NEGATIVE
NONHDLC SERPL-MCNC: 103 MG/DL
PH URINE: 7
PLATELET # BLD AUTO: 223 K/UL
POTASSIUM SERPL-SCNC: 4.2 MMOL/L
PROT SERPL-MCNC: 7.9 G/DL
PROTEIN URINE: NEGATIVE MG/DL
RBC # BLD: 5.14 M/UL
RBC # FLD: 12.2 %
SODIUM SERPL-SCNC: 141 MMOL/L
SPECIFIC GRAVITY URINE: 1.01
TRIGL SERPL-MCNC: 171 MG/DL
TSH SERPL-ACNC: 2.77 UIU/ML
UROBILINOGEN URINE: 0.2 MG/DL
VIT B12 SERPL-MCNC: 571 PG/ML
WBC # FLD AUTO: 5.87 K/UL

## 2024-12-11 ENCOUNTER — APPOINTMENT (OUTPATIENT)
Dept: CARDIOTHORACIC SURGERY | Facility: CLINIC | Age: 54
End: 2024-12-11
Payer: COMMERCIAL

## 2024-12-11 VITALS
WEIGHT: 180 LBS | HEART RATE: 77 BPM | SYSTOLIC BLOOD PRESSURE: 123 MMHG | BODY MASS INDEX: 28.25 KG/M2 | TEMPERATURE: 98.7 F | OXYGEN SATURATION: 95 % | HEIGHT: 67 IN | DIASTOLIC BLOOD PRESSURE: 79 MMHG | RESPIRATION RATE: 16 BRPM

## 2024-12-11 DIAGNOSIS — I35.0 NONRHEUMATIC AORTIC (VALVE) STENOSIS: ICD-10-CM

## 2024-12-11 DIAGNOSIS — Q23.81 BICUSPID AORTIC VALVE: ICD-10-CM

## 2024-12-11 DIAGNOSIS — I47.10 SUPRAVENTRICULAR TACHYCARDIA, UNSPECIFIED: ICD-10-CM

## 2024-12-11 DIAGNOSIS — I71.9 AORTIC ANEURYSM OF UNSPECIFIED SITE, W/OUT RUPTURE: ICD-10-CM

## 2024-12-11 DIAGNOSIS — I71.21 ANEURYSM OF THE ASCENDING AORTA, WITHOUT RUPTURE: ICD-10-CM

## 2024-12-11 DIAGNOSIS — E78.5 HYPERLIPIDEMIA, UNSPECIFIED: ICD-10-CM

## 2024-12-11 DIAGNOSIS — I10 ESSENTIAL (PRIMARY) HYPERTENSION: ICD-10-CM

## 2024-12-11 PROCEDURE — 99214 OFFICE O/P EST MOD 30 MIN: CPT

## 2024-12-11 RX ORDER — PSYLLIUM HUSK 100 %
POWDER (GRAM) MISCELLANEOUS
Refills: 0 | Status: ACTIVE | COMMUNITY
Start: 2024-12-11

## 2025-03-29 ENCOUNTER — APPOINTMENT (OUTPATIENT)
Dept: MRI IMAGING | Facility: CLINIC | Age: 55
End: 2025-03-29

## 2025-03-29 ENCOUNTER — OUTPATIENT (OUTPATIENT)
Dept: OUTPATIENT SERVICES | Facility: HOSPITAL | Age: 55
LOS: 1 days | End: 2025-03-29
Payer: COMMERCIAL

## 2025-03-29 DIAGNOSIS — I71.9 AORTIC ANEURYSM OF UNSPECIFIED SITE, WITHOUT RUPTURE: ICD-10-CM

## 2025-03-29 DIAGNOSIS — N44.00 TORSION OF TESTIS, UNSPECIFIED: Chronic | ICD-10-CM

## 2025-03-29 DIAGNOSIS — Z98.890 OTHER SPECIFIED POSTPROCEDURAL STATES: Chronic | ICD-10-CM

## 2025-03-29 PROCEDURE — 71555 MRI ANGIO CHEST W OR W/O DYE: CPT | Mod: 26

## 2025-03-29 PROCEDURE — C8911: CPT

## 2025-03-29 PROCEDURE — A9585: CPT

## 2025-04-14 ENCOUNTER — APPOINTMENT (OUTPATIENT)
Dept: CARDIOLOGY | Facility: CLINIC | Age: 55
End: 2025-04-14
Payer: COMMERCIAL

## 2025-04-14 ENCOUNTER — NON-APPOINTMENT (OUTPATIENT)
Age: 55
End: 2025-04-14

## 2025-04-14 VITALS
DIASTOLIC BLOOD PRESSURE: 82 MMHG | BODY MASS INDEX: 28.09 KG/M2 | HEART RATE: 71 BPM | OXYGEN SATURATION: 98 % | HEIGHT: 67 IN | WEIGHT: 179 LBS | SYSTOLIC BLOOD PRESSURE: 136 MMHG | TEMPERATURE: 97.5 F | RESPIRATION RATE: 16 BRPM

## 2025-04-14 VITALS — SYSTOLIC BLOOD PRESSURE: 122 MMHG | DIASTOLIC BLOOD PRESSURE: 74 MMHG

## 2025-04-14 DIAGNOSIS — I25.10 ATHEROSCLEROTIC HEART DISEASE OF NATIVE CORONARY ARTERY W/OUT ANGINA PECTORIS: ICD-10-CM

## 2025-04-14 DIAGNOSIS — Z91.09 OTHER ALLERGY STATUS, OTHER THAN TO DRUGS AND BIOLOGICAL SUBSTANCES: ICD-10-CM

## 2025-04-14 DIAGNOSIS — Q23.81 BICUSPID AORTIC VALVE: ICD-10-CM

## 2025-04-14 LAB
25(OH)D3 SERPL-MCNC: 40.6 NG/ML
ALBUMIN SERPL ELPH-MCNC: 4.8 G/DL
ALP BLD-CCNC: 69 U/L
ALT SERPL-CCNC: 32 U/L
ANION GAP SERPL CALC-SCNC: 16 MMOL/L
APPEARANCE: CLEAR
AST SERPL-CCNC: 25 U/L
BASOPHILS # BLD AUTO: 0.06 K/UL
BASOPHILS NFR BLD AUTO: 1 %
BILIRUB SERPL-MCNC: 1.1 MG/DL
BILIRUBIN URINE: NEGATIVE
BLOOD URINE: NEGATIVE
BUN SERPL-MCNC: 17 MG/DL
CALCIUM SERPL-MCNC: 10 MG/DL
CHLORIDE SERPL-SCNC: 102 MMOL/L
CHOLEST SERPL-MCNC: 136 MG/DL
CO2 SERPL-SCNC: 24 MMOL/L
COLOR: YELLOW
CREAT SERPL-MCNC: 1.01 MG/DL
EGFRCR SERPLBLD CKD-EPI 2021: 88 ML/MIN/1.73M2
EOSINOPHIL # BLD AUTO: 0.21 K/UL
EOSINOPHIL NFR BLD AUTO: 3.6 %
ESTIMATED AVERAGE GLUCOSE: 100 MG/DL
FOLATE SERPL-MCNC: 18.7 NG/ML
GLUCOSE QUALITATIVE U: NEGATIVE MG/DL
GLUCOSE SERPL-MCNC: 91 MG/DL
HBA1C MFR BLD HPLC: 5.1 %
HCT VFR BLD CALC: 49.7 %
HDLC SERPL-MCNC: 49 MG/DL
HGB BLD-MCNC: 16 G/DL
IMM GRANULOCYTES NFR BLD AUTO: 0.2 %
KETONES URINE: NEGATIVE MG/DL
LDLC SERPL-MCNC: 64 MG/DL
LEUKOCYTE ESTERASE URINE: NEGATIVE
LYMPHOCYTES # BLD AUTO: 1.16 K/UL
LYMPHOCYTES NFR BLD AUTO: 20.1 %
MAN DIFF?: NORMAL
MCHC RBC-ENTMCNC: 31.6 PG
MCHC RBC-ENTMCNC: 32.2 G/DL
MCV RBC AUTO: 98.2 FL
MONOCYTES # BLD AUTO: 0.52 K/UL
MONOCYTES NFR BLD AUTO: 9 %
NEUTROPHILS # BLD AUTO: 3.81 K/UL
NEUTROPHILS NFR BLD AUTO: 66.1 %
NITRITE URINE: NEGATIVE
NONHDLC SERPL-MCNC: 87 MG/DL
PH URINE: 6
PLATELET # BLD AUTO: 213 K/UL
POTASSIUM SERPL-SCNC: 4.6 MMOL/L
PROT SERPL-MCNC: 7.3 G/DL
PROTEIN URINE: NEGATIVE MG/DL
PSA SERPL-MCNC: 0.96 NG/ML
RBC # BLD: 5.06 M/UL
RBC # FLD: 12.5 %
SODIUM SERPL-SCNC: 141 MMOL/L
SPECIFIC GRAVITY URINE: 1.01
TRIGL SERPL-MCNC: 136 MG/DL
TSH SERPL-ACNC: 2.74 UIU/ML
URATE SERPL-MCNC: 7.6 MG/DL
UROBILINOGEN URINE: 0.2 MG/DL
VIT B12 SERPL-MCNC: 586 PG/ML
WBC # FLD AUTO: 5.77 K/UL

## 2025-04-14 PROCEDURE — 99214 OFFICE O/P EST MOD 30 MIN: CPT | Mod: 25

## 2025-04-14 PROCEDURE — 93000 ELECTROCARDIOGRAM COMPLETE: CPT

## 2025-04-14 RX ORDER — FLUTICASONE PROPIONATE 50 UG/1
50 SPRAY, METERED NASAL
Qty: 1 | Refills: 0 | Status: ACTIVE | COMMUNITY
Start: 2025-04-14 | End: 1900-01-01

## 2025-06-05 ENCOUNTER — APPOINTMENT (OUTPATIENT)
Dept: UROLOGY | Facility: CLINIC | Age: 55
End: 2025-06-05
Payer: COMMERCIAL

## 2025-06-05 VITALS
SYSTOLIC BLOOD PRESSURE: 127 MMHG | DIASTOLIC BLOOD PRESSURE: 82 MMHG | BODY MASS INDEX: 28.25 KG/M2 | HEART RATE: 75 BPM | RESPIRATION RATE: 16 BRPM | HEIGHT: 67 IN | WEIGHT: 180 LBS | TEMPERATURE: 97.4 F | OXYGEN SATURATION: 97 %

## 2025-06-05 DIAGNOSIS — R68.82 DECREASED LIBIDO: ICD-10-CM

## 2025-06-05 DIAGNOSIS — N50.82 SCROTAL PAIN: ICD-10-CM

## 2025-06-05 DIAGNOSIS — N40.1 BENIGN PROSTATIC HYPERPLASIA WITH LOWER URINARY TRACT SYMPMS: ICD-10-CM

## 2025-06-05 DIAGNOSIS — N13.8 BENIGN PROSTATIC HYPERPLASIA WITH LOWER URINARY TRACT SYMPMS: ICD-10-CM

## 2025-06-05 PROCEDURE — 99204 OFFICE O/P NEW MOD 45 MIN: CPT

## 2025-06-05 PROCEDURE — G2211 COMPLEX E/M VISIT ADD ON: CPT | Mod: NC

## 2025-06-05 RX ORDER — DOXYCYCLINE HYCLATE 100 MG/1
100 CAPSULE ORAL
Qty: 14 | Refills: 0 | Status: ACTIVE | COMMUNITY
Start: 2025-06-05 | End: 1900-01-01

## 2025-06-13 ENCOUNTER — APPOINTMENT (OUTPATIENT)
Dept: ULTRASOUND IMAGING | Facility: CLINIC | Age: 55
End: 2025-06-13
Payer: COMMERCIAL

## 2025-06-13 ENCOUNTER — OUTPATIENT (OUTPATIENT)
Dept: OUTPATIENT SERVICES | Facility: HOSPITAL | Age: 55
LOS: 1 days | End: 2025-06-13
Payer: COMMERCIAL

## 2025-06-13 DIAGNOSIS — Z98.890 OTHER SPECIFIED POSTPROCEDURAL STATES: Chronic | ICD-10-CM

## 2025-06-13 DIAGNOSIS — N44.00 TORSION OF TESTIS, UNSPECIFIED: Chronic | ICD-10-CM

## 2025-06-13 DIAGNOSIS — N50.82 SCROTAL PAIN: ICD-10-CM

## 2025-06-13 LAB
ESTRADIOL SERPL-MCNC: 22 PG/ML
HCT VFR BLD CALC: 48 %
HGB BLD-MCNC: 16 G/DL
LH SERPL-ACNC: 7.4 IU/L
MCHC RBC-ENTMCNC: 31.4 PG
MCHC RBC-ENTMCNC: 33.3 G/DL
MCV RBC AUTO: 94.3 FL
PLATELET # BLD AUTO: 200 K/UL
PROLACTIN SERPL-MCNC: 7 NG/ML
RBC # BLD: 5.09 M/UL
RBC # FLD: 12.2 %
TESTOST SERPL-MCNC: 547 NG/DL
WBC # FLD AUTO: 5.7 K/UL

## 2025-06-13 PROCEDURE — 76870 US EXAM SCROTUM: CPT | Mod: 26

## 2025-06-13 PROCEDURE — 76770 US EXAM ABDO BACK WALL COMP: CPT

## 2025-06-13 PROCEDURE — 76770 US EXAM ABDO BACK WALL COMP: CPT | Mod: 26

## 2025-06-13 PROCEDURE — 76870 US EXAM SCROTUM: CPT

## 2025-06-16 ENCOUNTER — APPOINTMENT (OUTPATIENT)
Dept: ULTRASOUND IMAGING | Facility: CLINIC | Age: 55
End: 2025-06-16

## 2025-07-18 RX ORDER — TICAGRELOR 60 MG/1
60 TABLET, FILM COATED ORAL
Qty: 180 | Refills: 1 | Status: ACTIVE | COMMUNITY
Start: 2025-07-17 | End: 1900-01-01

## 2025-08-19 ENCOUNTER — APPOINTMENT (OUTPATIENT)
Dept: CARDIOLOGY | Facility: CLINIC | Age: 55
End: 2025-08-19
Payer: COMMERCIAL

## 2025-08-19 PROCEDURE — 93306 TTE W/DOPPLER COMPLETE: CPT

## 2025-08-26 ENCOUNTER — APPOINTMENT (OUTPATIENT)
Dept: CARDIOLOGY | Facility: CLINIC | Age: 55
End: 2025-08-26
Payer: COMMERCIAL

## 2025-08-26 ENCOUNTER — NON-APPOINTMENT (OUTPATIENT)
Age: 55
End: 2025-08-26

## 2025-08-26 VITALS
BODY MASS INDEX: 28.25 KG/M2 | WEIGHT: 180 LBS | HEART RATE: 73 BPM | DIASTOLIC BLOOD PRESSURE: 78 MMHG | HEIGHT: 67 IN | TEMPERATURE: 98.1 F | SYSTOLIC BLOOD PRESSURE: 115 MMHG | RESPIRATION RATE: 18 BRPM | OXYGEN SATURATION: 96 %

## 2025-08-26 DIAGNOSIS — I25.10 ATHEROSCLEROTIC HEART DISEASE OF NATIVE CORONARY ARTERY W/OUT ANGINA PECTORIS: ICD-10-CM

## 2025-08-26 LAB
25(OH)D3 SERPL-MCNC: 47.7 NG/ML
ALBUMIN SERPL ELPH-MCNC: 4.5 G/DL
ALP BLD-CCNC: 71 U/L
ALT SERPL-CCNC: 36 U/L
ANION GAP SERPL CALC-SCNC: 13 MMOL/L
APPEARANCE: CLEAR
AST SERPL-CCNC: 28 U/L
BASOPHILS # BLD AUTO: 0.04 K/UL
BASOPHILS NFR BLD AUTO: 0.9 %
BILIRUB SERPL-MCNC: 1.2 MG/DL
BILIRUBIN URINE: NEGATIVE
BLOOD URINE: NEGATIVE
BUN SERPL-MCNC: 13 MG/DL
CALCIUM SERPL-MCNC: 9.8 MG/DL
CHLORIDE SERPL-SCNC: 102 MMOL/L
CHOLEST SERPL-MCNC: 144 MG/DL
CO2 SERPL-SCNC: 25 MMOL/L
COLOR: YELLOW
CREAT SERPL-MCNC: 0.97 MG/DL
EGFRCR SERPLBLD CKD-EPI 2021: 92 ML/MIN/1.73M2
EOSINOPHIL # BLD AUTO: 0.16 K/UL
EOSINOPHIL NFR BLD AUTO: 3.5 %
ESTIMATED AVERAGE GLUCOSE: 97 MG/DL
FOLATE SERPL-MCNC: 15.2 NG/ML
GLUCOSE QUALITATIVE U: NEGATIVE MG/DL
GLUCOSE SERPL-MCNC: 90 MG/DL
HBA1C MFR BLD HPLC: 5 %
HCT VFR BLD CALC: 46.3 %
HDLC SERPL-MCNC: 50 MG/DL
HGB BLD-MCNC: 15.5 G/DL
IMM GRANULOCYTES NFR BLD AUTO: 0.2 %
KETONES URINE: NEGATIVE MG/DL
LDLC SERPL-MCNC: 64 MG/DL
LEUKOCYTE ESTERASE URINE: NEGATIVE
LYMPHOCYTES # BLD AUTO: 0.93 K/UL
LYMPHOCYTES NFR BLD AUTO: 20.5 %
MAN DIFF?: NORMAL
MCHC RBC-ENTMCNC: 32 PG
MCHC RBC-ENTMCNC: 33.5 G/DL
MCV RBC AUTO: 95.5 FL
MONOCYTES # BLD AUTO: 0.48 K/UL
MONOCYTES NFR BLD AUTO: 10.6 %
NEUTROPHILS # BLD AUTO: 2.91 K/UL
NEUTROPHILS NFR BLD AUTO: 64.3 %
NITRITE URINE: NEGATIVE
NONHDLC SERPL-MCNC: 94 MG/DL
PH URINE: 6.5
PLATELET # BLD AUTO: 199 K/UL
POTASSIUM SERPL-SCNC: 4.2 MMOL/L
PROT SERPL-MCNC: 7.2 G/DL
PROTEIN URINE: NEGATIVE MG/DL
RBC # BLD: 4.85 M/UL
RBC # FLD: 12.3 %
SODIUM SERPL-SCNC: 140 MMOL/L
SPECIFIC GRAVITY URINE: 1.01
TRIGL SERPL-MCNC: 175 MG/DL
TSH SERPL-ACNC: 2.08 UIU/ML
UROBILINOGEN URINE: 0.2 MG/DL
VIT B12 SERPL-MCNC: 478 PG/ML
WBC # FLD AUTO: 4.53 K/UL

## 2025-08-26 PROCEDURE — 99214 OFFICE O/P EST MOD 30 MIN: CPT | Mod: 25

## 2025-08-26 PROCEDURE — 93000 ELECTROCARDIOGRAM COMPLETE: CPT

## 2025-09-10 ENCOUNTER — APPOINTMENT (OUTPATIENT)
Dept: CARDIOTHORACIC SURGERY | Facility: CLINIC | Age: 55
End: 2025-09-10
Payer: COMMERCIAL

## 2025-09-10 VITALS
BODY MASS INDEX: 28.25 KG/M2 | HEIGHT: 67 IN | OXYGEN SATURATION: 95 % | SYSTOLIC BLOOD PRESSURE: 138 MMHG | RESPIRATION RATE: 14 BRPM | HEART RATE: 66 BPM | DIASTOLIC BLOOD PRESSURE: 87 MMHG | WEIGHT: 180 LBS | TEMPERATURE: 98.3 F

## 2025-09-10 DIAGNOSIS — I10 ESSENTIAL (PRIMARY) HYPERTENSION: ICD-10-CM

## 2025-09-10 DIAGNOSIS — N40.1 BENIGN PROSTATIC HYPERPLASIA WITH LOWER URINARY TRACT SYMPMS: ICD-10-CM

## 2025-09-10 DIAGNOSIS — I47.10 SUPRAVENTRICULAR TACHYCARDIA, UNSPECIFIED: ICD-10-CM

## 2025-09-10 DIAGNOSIS — I71.9 AORTIC ANEURYSM OF UNSPECIFIED SITE, W/OUT RUPTURE: ICD-10-CM

## 2025-09-10 DIAGNOSIS — N13.8 BENIGN PROSTATIC HYPERPLASIA WITH LOWER URINARY TRACT SYMPMS: ICD-10-CM

## 2025-09-10 DIAGNOSIS — E78.5 HYPERLIPIDEMIA, UNSPECIFIED: ICD-10-CM

## 2025-09-10 PROCEDURE — 99215 OFFICE O/P EST HI 40 MIN: CPT

## 2025-09-16 ENCOUNTER — APPOINTMENT (OUTPATIENT)
Dept: GASTROENTEROLOGY | Facility: CLINIC | Age: 55
End: 2025-09-16
Payer: COMMERCIAL

## 2025-09-16 VITALS
BODY MASS INDEX: 27.78 KG/M2 | WEIGHT: 177 LBS | RESPIRATION RATE: 16 BRPM | HEIGHT: 67 IN | SYSTOLIC BLOOD PRESSURE: 130 MMHG | TEMPERATURE: 98 F | HEART RATE: 68 BPM | OXYGEN SATURATION: 99 % | DIASTOLIC BLOOD PRESSURE: 80 MMHG

## 2025-09-16 DIAGNOSIS — R10.31 RIGHT LOWER QUADRANT PAIN: ICD-10-CM

## 2025-09-16 DIAGNOSIS — Q23.81 BICUSPID AORTIC VALVE: ICD-10-CM

## 2025-09-16 DIAGNOSIS — I71.21 ANEURYSM OF THE ASCENDING AORTA, WITHOUT RUPTURE: ICD-10-CM

## 2025-09-16 DIAGNOSIS — Z12.11 ENCOUNTER FOR SCREENING FOR MALIGNANT NEOPLASM OF COLON: ICD-10-CM

## 2025-09-16 PROCEDURE — G2211 COMPLEX E/M VISIT ADD ON: CPT | Mod: NC

## 2025-09-16 PROCEDURE — 99204 OFFICE O/P NEW MOD 45 MIN: CPT
